# Patient Record
Sex: MALE | Race: WHITE | NOT HISPANIC OR LATINO | Employment: FULL TIME | URBAN - METROPOLITAN AREA
[De-identification: names, ages, dates, MRNs, and addresses within clinical notes are randomized per-mention and may not be internally consistent; named-entity substitution may affect disease eponyms.]

---

## 2022-08-31 ENCOUNTER — HOSPITAL ENCOUNTER (OUTPATIENT)
Facility: HOSPITAL | Age: 52
Setting detail: OBSERVATION
Discharge: HOME | End: 2022-09-02
Attending: EMERGENCY MEDICINE | Admitting: INTERNAL MEDICINE
Payer: COMMERCIAL

## 2022-08-31 DIAGNOSIS — R07.89 ATYPICAL CHEST PAIN: Primary | ICD-10-CM

## 2022-08-31 DIAGNOSIS — I24.9 ACS (ACUTE CORONARY SYNDROME) (HCC): ICD-10-CM

## 2022-08-31 DIAGNOSIS — I10 HTN (HYPERTENSION): ICD-10-CM

## 2022-08-31 DIAGNOSIS — I25.10 CAD (CORONARY ARTERY DISEASE): ICD-10-CM

## 2022-08-31 DIAGNOSIS — I25.110 CORONARY ARTERY DISEASE INVOLVING NATIVE CORONARY ARTERY OF NATIVE HEART WITH UNSTABLE ANGINA PECTORIS (HCC): ICD-10-CM

## 2022-08-31 LAB
BASOPHILS # BLD AUTO: 0.02 THOUSANDS/ΜL (ref 0–0.1)
BASOPHILS NFR BLD AUTO: 0 % (ref 0–1)
EOSINOPHIL # BLD AUTO: 0 THOUSAND/ΜL (ref 0–0.61)
EOSINOPHIL NFR BLD AUTO: 0 % (ref 0–6)
ERYTHROCYTE [DISTWIDTH] IN BLOOD BY AUTOMATED COUNT: 13.2 % (ref 11.6–15.1)
HCT VFR BLD AUTO: 44.9 % (ref 36.5–49.3)
HGB BLD-MCNC: 15.2 G/DL (ref 12–17)
IMM GRANULOCYTES # BLD AUTO: 0.04 THOUSAND/UL (ref 0–0.2)
IMM GRANULOCYTES NFR BLD AUTO: 1 % (ref 0–2)
LYMPHOCYTES # BLD AUTO: 0.25 THOUSANDS/ΜL (ref 0.6–4.47)
LYMPHOCYTES NFR BLD AUTO: 3 % (ref 14–44)
MCH RBC QN AUTO: 32.2 PG (ref 26.8–34.3)
MCHC RBC AUTO-ENTMCNC: 33.9 G/DL (ref 31.4–37.4)
MCV RBC AUTO: 95 FL (ref 82–98)
MONOCYTES # BLD AUTO: 0.68 THOUSAND/ΜL (ref 0.17–1.22)
MONOCYTES NFR BLD AUTO: 8 % (ref 4–12)
NEUTROPHILS # BLD AUTO: 7.86 THOUSANDS/ΜL (ref 1.85–7.62)
NEUTS SEG NFR BLD AUTO: 88 % (ref 43–75)
NRBC BLD AUTO-RTO: 0 /100 WBCS
PLATELET # BLD AUTO: 172 THOUSANDS/UL (ref 149–390)
PMV BLD AUTO: 11.4 FL (ref 8.9–12.7)
RBC # BLD AUTO: 4.72 MILLION/UL (ref 3.88–5.62)
WBC # BLD AUTO: 8.85 THOUSAND/UL (ref 4.31–10.16)

## 2022-08-31 PROCEDURE — 80053 COMPREHEN METABOLIC PANEL: CPT | Performed by: PHYSICIAN ASSISTANT

## 2022-08-31 PROCEDURE — 99285 EMERGENCY DEPT VISIT HI MDM: CPT

## 2022-08-31 PROCEDURE — 85025 COMPLETE CBC W/AUTO DIFF WBC: CPT | Performed by: PHYSICIAN ASSISTANT

## 2022-08-31 PROCEDURE — 85730 THROMBOPLASTIN TIME PARTIAL: CPT | Performed by: PHYSICIAN ASSISTANT

## 2022-08-31 PROCEDURE — 36415 COLL VENOUS BLD VENIPUNCTURE: CPT | Performed by: PHYSICIAN ASSISTANT

## 2022-08-31 PROCEDURE — 93005 ELECTROCARDIOGRAM TRACING: CPT

## 2022-08-31 PROCEDURE — 83036 HEMOGLOBIN GLYCOSYLATED A1C: CPT | Performed by: PHYSICIAN ASSISTANT

## 2022-08-31 PROCEDURE — 85610 PROTHROMBIN TIME: CPT | Performed by: PHYSICIAN ASSISTANT

## 2022-08-31 PROCEDURE — 84484 ASSAY OF TROPONIN QUANT: CPT | Performed by: PHYSICIAN ASSISTANT

## 2022-08-31 RX ORDER — CLOPIDOGREL BISULFATE 75 MG/1
300 TABLET ORAL ONCE
Status: COMPLETED | OUTPATIENT
Start: 2022-08-31 | End: 2022-08-31

## 2022-08-31 RX ORDER — ASPIRIN 325 MG
325 TABLET ORAL ONCE
Status: COMPLETED | OUTPATIENT
Start: 2022-08-31 | End: 2022-08-31

## 2022-08-31 RX ORDER — ONDANSETRON 2 MG/ML
1 INJECTION INTRAMUSCULAR; INTRAVENOUS ONCE
Status: COMPLETED | OUTPATIENT
Start: 2022-08-31 | End: 2022-08-31

## 2022-08-31 RX ADMIN — ASPIRIN 325 MG ORAL TABLET 325 MG: 325 PILL ORAL at 23:42

## 2022-08-31 RX ADMIN — CLOPIDOGREL BISULFATE 300 MG: 75 TABLET ORAL at 23:51

## 2022-08-31 NOTE — Clinical Note
IVUS and IFR wire removed  Two eagle eye catheters were opened  The system is not working at this time

## 2022-09-01 ENCOUNTER — APPOINTMENT (OUTPATIENT)
Dept: NUCLEAR MEDICINE | Facility: HOSPITAL | Age: 52
End: 2022-09-01
Payer: COMMERCIAL

## 2022-09-01 ENCOUNTER — APPOINTMENT (OUTPATIENT)
Dept: NON INVASIVE DIAGNOSTICS | Facility: HOSPITAL | Age: 52
End: 2022-09-01
Payer: COMMERCIAL

## 2022-09-01 ENCOUNTER — APPOINTMENT (OUTPATIENT)
Dept: RADIOLOGY | Facility: HOSPITAL | Age: 52
End: 2022-09-01
Payer: COMMERCIAL

## 2022-09-01 PROBLEM — I25.10 CORONARY ARTERY DISEASE: Chronic | Status: ACTIVE | Noted: 2022-09-01

## 2022-09-01 PROBLEM — E11.9 DIABETES MELLITUS, TYPE 2 (HCC): Chronic | Status: ACTIVE | Noted: 2022-09-01

## 2022-09-01 PROBLEM — I10 HYPERTENSION: Status: ACTIVE | Noted: 2022-09-01

## 2022-09-01 PROBLEM — R07.89 ATYPICAL CHEST PAIN: Status: ACTIVE | Noted: 2022-09-01

## 2022-09-01 LAB
2HR DELTA HS TROPONIN: 0 NG/L
4HR DELTA HS TROPONIN: 1 NG/L
ALBUMIN SERPL BCP-MCNC: 4.4 G/DL (ref 3.5–5)
ALP SERPL-CCNC: 74 U/L (ref 46–116)
ALT SERPL W P-5'-P-CCNC: 45 U/L (ref 12–78)
ANION GAP SERPL CALCULATED.3IONS-SCNC: 12 MMOL/L (ref 4–13)
AORTIC ROOT: 3.8 CM
AORTIC VALVE MEAN VELOCITY: 11.9 M/S
APICAL FOUR CHAMBER EJECTION FRACTION: 65 %
APTT PPP: 26 SECONDS (ref 23–37)
ASCENDING AORTA: 3.8 CM
AST SERPL W P-5'-P-CCNC: 25 U/L (ref 5–45)
ATRIAL RATE: 67 BPM
ATRIAL RATE: 68 BPM
ATRIAL RATE: 72 BPM
ATRIAL RATE: 72 BPM
ATRIAL RATE: 73 BPM
ATRIAL RATE: 75 BPM
AV LVOT MEAN GRADIENT: 5 MMHG
AV LVOT PEAK GRADIENT: 9 MMHG
AV MEAN GRADIENT: 6 MMHG
AV PEAK GRADIENT: 10 MMHG
AV VELOCITY RATIO: 0.95
BILIRUB SERPL-MCNC: 0.47 MG/DL (ref 0.2–1)
BUN SERPL-MCNC: 11 MG/DL (ref 5–25)
CALCIUM SERPL-MCNC: 9.4 MG/DL (ref 8.3–10.1)
CARDIAC TROPONIN I PNL SERPL HS: 7 NG/L
CARDIAC TROPONIN I PNL SERPL HS: 7 NG/L
CARDIAC TROPONIN I PNL SERPL HS: 8 NG/L
CHLORIDE SERPL-SCNC: 101 MMOL/L (ref 96–108)
CO2 SERPL-SCNC: 25 MMOL/L (ref 21–32)
CREAT SERPL-MCNC: 1.07 MG/DL (ref 0.6–1.3)
DOP CALC AO PEAK VEL: 1.59 M/S
DOP CALC AO VTI: 28.08 CM
DOP CALC LVOT PEAK VEL VTI: 27.57 CM
DOP CALC LVOT PEAK VEL: 1.51 M/S
E WAVE DECELERATION TIME: 283 MS
EST. AVERAGE GLUCOSE BLD GHB EST-MCNC: 120 MG/DL
FRACTIONAL SHORTENING: 31 % (ref 28–44)
GFR SERPL CREATININE-BSD FRML MDRD: 80 ML/MIN/1.73SQ M
GLUCOSE SERPL-MCNC: 107 MG/DL (ref 65–140)
GLUCOSE SERPL-MCNC: 127 MG/DL (ref 65–140)
GLUCOSE SERPL-MCNC: 88 MG/DL (ref 65–140)
GLUCOSE SERPL-MCNC: 90 MG/DL (ref 65–140)
GLUCOSE SERPL-MCNC: 98 MG/DL (ref 65–140)
HBA1C MFR BLD: 5.8 %
INR PPP: 1.02 (ref 0.84–1.19)
INTERVENTRICULAR SEPTUM IN DIASTOLE (PARASTERNAL SHORT AXIS VIEW): 1.5 CM
INTERVENTRICULAR SEPTUM: 1.5 CM (ref 0.6–1.1)
LAAS-AP2: 15.5 CM2
LAAS-AP4: 18.9 CM2
LEFT ATRIUM AREA SYSTOLE SINGLE PLANE A4C: 19.9 CM2
LEFT ATRIUM SIZE: 3.5 CM
LEFT INTERNAL DIMENSION IN SYSTOLE: 2.5 CM (ref 2.1–4)
LEFT VENTRICULAR INTERNAL DIMENSION IN DIASTOLE: 3.6 CM (ref 3.5–6)
LEFT VENTRICULAR POSTERIOR WALL IN END DIASTOLE: 1.3 CM
LEFT VENTRICULAR STROKE VOLUME: 30 ML
LVSV (TEICH): 30 ML
MV E'TISSUE VEL-SEP: 9 CM/S
MV PEAK A VEL: 0.88 M/S
MV PEAK E VEL: 65 CM/S
MV STENOSIS PRESSURE HALF TIME: 82 MS
MV VALVE AREA P 1/2 METHOD: 2.68 CM2
P AXIS: 33 DEGREES
P AXIS: 39 DEGREES
P AXIS: 39 DEGREES
P AXIS: 46 DEGREES
P AXIS: 47 DEGREES
P AXIS: 70 DEGREES
POTASSIUM SERPL-SCNC: 4.2 MMOL/L (ref 3.5–5.3)
PR INTERVAL: 190 MS
PR INTERVAL: 194 MS
PR INTERVAL: 200 MS
PR INTERVAL: 200 MS
PR INTERVAL: 204 MS
PR INTERVAL: 208 MS
PROT SERPL-MCNC: 8.2 G/DL (ref 6.4–8.4)
PROTHROMBIN TIME: 13.2 SECONDS (ref 11.6–14.5)
QRS AXIS: -47 DEGREES
QRS AXIS: -52 DEGREES
QRS AXIS: -53 DEGREES
QRS AXIS: -55 DEGREES
QRS AXIS: -57 DEGREES
QRS AXIS: -57 DEGREES
QRSD INTERVAL: 100 MS
QRSD INTERVAL: 80 MS
QRSD INTERVAL: 82 MS
QRSD INTERVAL: 84 MS
QRSD INTERVAL: 86 MS
QRSD INTERVAL: 88 MS
QT INTERVAL: 376 MS
QT INTERVAL: 376 MS
QT INTERVAL: 378 MS
QT INTERVAL: 380 MS
QT INTERVAL: 390 MS
QT INTERVAL: 394 MS
QTC INTERVAL: 411 MS
QTC INTERVAL: 412 MS
QTC INTERVAL: 416 MS
QTC INTERVAL: 416 MS
QTC INTERVAL: 418 MS
QTC INTERVAL: 419 MS
RIGHT ATRIUM AREA SYSTOLE A4C: 14.8 CM2
RIGHT VENTRICLE ID DIMENSION: 2.9 CM
SL CV LEFT ATRIUM LENGTH A2C: 5.1 CM
SL CV LV EF: 55
SL CV PED ECHO LEFT VENTRICLE DIASTOLIC VOLUME (MOD BIPLANE) 2D: 54 ML
SL CV PED ECHO LEFT VENTRICLE SYSTOLIC VOLUME (MOD BIPLANE) 2D: 23 ML
SODIUM SERPL-SCNC: 138 MMOL/L (ref 135–147)
T WAVE AXIS: 72 DEGREES
T WAVE AXIS: 72 DEGREES
T WAVE AXIS: 78 DEGREES
T WAVE AXIS: 79 DEGREES
T WAVE AXIS: 80 DEGREES
T WAVE AXIS: 84 DEGREES
VENTRICULAR RATE: 67 BPM
VENTRICULAR RATE: 68 BPM
VENTRICULAR RATE: 72 BPM
VENTRICULAR RATE: 72 BPM
VENTRICULAR RATE: 73 BPM
VENTRICULAR RATE: 75 BPM

## 2022-09-01 PROCEDURE — C1769 GUIDE WIRE: HCPCS | Performed by: INTERNAL MEDICINE

## 2022-09-01 PROCEDURE — G1004 CDSM NDSC: HCPCS

## 2022-09-01 PROCEDURE — C1753 CATH, INTRAVAS ULTRASOUND: HCPCS | Performed by: INTERNAL MEDICINE

## 2022-09-01 PROCEDURE — 93571 IV DOP VEL&/PRESS C FLO 1ST: CPT | Performed by: INTERNAL MEDICINE

## 2022-09-01 PROCEDURE — 93018 CV STRESS TEST I&R ONLY: CPT | Performed by: INTERNAL MEDICINE

## 2022-09-01 PROCEDURE — A9502 TC99M TETROFOSMIN: HCPCS

## 2022-09-01 PROCEDURE — C1725 CATH, TRANSLUMIN NON-LASER: HCPCS | Performed by: INTERNAL MEDICINE

## 2022-09-01 PROCEDURE — 99153 MOD SED SAME PHYS/QHP EA: CPT | Performed by: INTERNAL MEDICINE

## 2022-09-01 PROCEDURE — 84484 ASSAY OF TROPONIN QUANT: CPT | Performed by: PHYSICIAN ASSISTANT

## 2022-09-01 PROCEDURE — 92978 ENDOLUMINL IVUS OCT C 1ST: CPT | Performed by: INTERNAL MEDICINE

## 2022-09-01 PROCEDURE — C1874 STENT, COATED/COV W/DEL SYS: HCPCS | Performed by: INTERNAL MEDICINE

## 2022-09-01 PROCEDURE — 78452 HT MUSCLE IMAGE SPECT MULT: CPT | Performed by: INTERNAL MEDICINE

## 2022-09-01 PROCEDURE — 36415 COLL VENOUS BLD VENIPUNCTURE: CPT | Performed by: PHYSICIAN ASSISTANT

## 2022-09-01 PROCEDURE — 92928 PRQ TCAT PLMT NTRAC ST 1 LES: CPT | Performed by: INTERNAL MEDICINE

## 2022-09-01 PROCEDURE — 93016 CV STRESS TEST SUPVJ ONLY: CPT | Performed by: INTERNAL MEDICINE

## 2022-09-01 PROCEDURE — 93010 ELECTROCARDIOGRAM REPORT: CPT | Performed by: INTERNAL MEDICINE

## 2022-09-01 PROCEDURE — 93017 CV STRESS TEST TRACING ONLY: CPT

## 2022-09-01 PROCEDURE — 99285 EMERGENCY DEPT VISIT HI MDM: CPT | Performed by: PHYSICIAN ASSISTANT

## 2022-09-01 PROCEDURE — 82948 REAGENT STRIP/BLOOD GLUCOSE: CPT

## 2022-09-01 PROCEDURE — 99152 MOD SED SAME PHYS/QHP 5/>YRS: CPT | Performed by: INTERNAL MEDICINE

## 2022-09-01 PROCEDURE — 99205 OFFICE O/P NEW HI 60 MIN: CPT | Performed by: INTERNAL MEDICINE

## 2022-09-01 PROCEDURE — 93458 L HRT ARTERY/VENTRICLE ANGIO: CPT | Performed by: INTERNAL MEDICINE

## 2022-09-01 PROCEDURE — 85347 COAGULATION TIME ACTIVATED: CPT

## 2022-09-01 PROCEDURE — C9600 PERC DRUG-EL COR STENT SING: HCPCS | Performed by: INTERNAL MEDICINE

## 2022-09-01 PROCEDURE — 93306 TTE W/DOPPLER COMPLETE: CPT

## 2022-09-01 PROCEDURE — C1887 CATHETER, GUIDING: HCPCS | Performed by: INTERNAL MEDICINE

## 2022-09-01 PROCEDURE — C1894 INTRO/SHEATH, NON-LASER: HCPCS | Performed by: INTERNAL MEDICINE

## 2022-09-01 PROCEDURE — 93005 ELECTROCARDIOGRAM TRACING: CPT

## 2022-09-01 PROCEDURE — 71045 X-RAY EXAM CHEST 1 VIEW: CPT

## 2022-09-01 PROCEDURE — 78452 HT MUSCLE IMAGE SPECT MULT: CPT

## 2022-09-01 PROCEDURE — 99219 PR INITIAL OBSERVATION CARE/DAY 50 MINUTES: CPT | Performed by: INTERNAL MEDICINE

## 2022-09-01 PROCEDURE — 93306 TTE W/DOPPLER COMPLETE: CPT | Performed by: INTERNAL MEDICINE

## 2022-09-01 DEVICE — STENT ONYXNG27512UX ONYX 2.75X12RX
Type: IMPLANTABLE DEVICE | Status: FUNCTIONAL
Brand: ONYX FRONTIER™

## 2022-09-01 DEVICE — STENT ONYXNG27538UX ONYX 2.75X38RX
Type: IMPLANTABLE DEVICE | Status: FUNCTIONAL
Brand: ONYX FRONTIER™

## 2022-09-01 RX ORDER — LIDOCAINE 50 MG/G
1 PATCH TOPICAL DAILY PRN
Status: DISCONTINUED | OUTPATIENT
Start: 2022-09-01 | End: 2022-09-02 | Stop reason: HOSPADM

## 2022-09-01 RX ORDER — NITROGLYCERIN 20 MG/100ML
INJECTION INTRAVENOUS AS NEEDED
Status: DISCONTINUED | OUTPATIENT
Start: 2022-09-01 | End: 2022-09-01 | Stop reason: HOSPADM

## 2022-09-01 RX ORDER — METOPROLOL SUCCINATE 25 MG/1
25 TABLET, EXTENDED RELEASE ORAL DAILY
COMMUNITY
End: 2022-09-16 | Stop reason: SDUPTHER

## 2022-09-01 RX ORDER — AMINOPHYLLINE DIHYDRATE 25 MG/ML
50 INJECTION, SOLUTION INTRAVENOUS ONCE
Status: COMPLETED | OUTPATIENT
Start: 2022-09-01 | End: 2022-09-01

## 2022-09-01 RX ORDER — HEPARIN SODIUM 1000 [USP'U]/ML
INJECTION, SOLUTION INTRAVENOUS; SUBCUTANEOUS AS NEEDED
Status: DISCONTINUED | OUTPATIENT
Start: 2022-09-01 | End: 2022-09-01 | Stop reason: HOSPADM

## 2022-09-01 RX ORDER — AMLODIPINE BESYLATE 10 MG/1
10 TABLET ORAL DAILY
COMMUNITY
End: 2022-09-16 | Stop reason: SDUPTHER

## 2022-09-01 RX ORDER — METOPROLOL SUCCINATE 25 MG/1
25 TABLET, EXTENDED RELEASE ORAL DAILY
Status: DISCONTINUED | OUTPATIENT
Start: 2022-09-01 | End: 2022-09-02 | Stop reason: HOSPADM

## 2022-09-01 RX ORDER — ASPIRIN 81 MG/1
81 TABLET ORAL DAILY
Status: DISCONTINUED | OUTPATIENT
Start: 2022-09-01 | End: 2022-09-02 | Stop reason: HOSPADM

## 2022-09-01 RX ORDER — SPIRONOLACTONE 25 MG/1
25 TABLET ORAL DAILY
COMMUNITY
End: 2022-09-06 | Stop reason: SDUPTHER

## 2022-09-01 RX ORDER — ASPIRIN 81 MG/1
81 TABLET ORAL DAILY
COMMUNITY
End: 2022-09-16 | Stop reason: SDUPTHER

## 2022-09-01 RX ORDER — FENTANYL CITRATE 50 UG/ML
INJECTION, SOLUTION INTRAMUSCULAR; INTRAVENOUS AS NEEDED
Status: DISCONTINUED | OUTPATIENT
Start: 2022-09-01 | End: 2022-09-01 | Stop reason: HOSPADM

## 2022-09-01 RX ORDER — MAGNESIUM HYDROXIDE/ALUMINUM HYDROXICE/SIMETHICONE 120; 1200; 1200 MG/30ML; MG/30ML; MG/30ML
30 SUSPENSION ORAL EVERY 6 HOURS PRN
Status: DISCONTINUED | OUTPATIENT
Start: 2022-09-01 | End: 2022-09-02 | Stop reason: HOSPADM

## 2022-09-01 RX ORDER — MIDAZOLAM HYDROCHLORIDE 2 MG/2ML
INJECTION, SOLUTION INTRAMUSCULAR; INTRAVENOUS AS NEEDED
Status: DISCONTINUED | OUTPATIENT
Start: 2022-09-01 | End: 2022-09-01 | Stop reason: HOSPADM

## 2022-09-01 RX ORDER — NICOTINE 21 MG/24HR
1 PATCH, TRANSDERMAL 24 HOURS TRANSDERMAL DAILY
Status: DISCONTINUED | OUTPATIENT
Start: 2022-09-01 | End: 2022-09-01

## 2022-09-01 RX ORDER — ATORVASTATIN CALCIUM 80 MG/1
80 TABLET, FILM COATED ORAL
Status: ON HOLD | COMMUNITY
End: 2022-09-02 | Stop reason: SDUPTHER

## 2022-09-01 RX ORDER — AMLODIPINE BESYLATE 10 MG/1
10 TABLET ORAL DAILY
Status: DISCONTINUED | OUTPATIENT
Start: 2022-09-01 | End: 2022-09-02 | Stop reason: HOSPADM

## 2022-09-01 RX ORDER — HEPARIN SODIUM 5000 [USP'U]/ML
5000 INJECTION, SOLUTION INTRAVENOUS; SUBCUTANEOUS EVERY 8 HOURS SCHEDULED
Status: DISCONTINUED | OUTPATIENT
Start: 2022-09-01 | End: 2022-09-02 | Stop reason: HOSPADM

## 2022-09-01 RX ORDER — CLOPIDOGREL BISULFATE 75 MG/1
TABLET ORAL AS NEEDED
Status: DISCONTINUED | OUTPATIENT
Start: 2022-09-01 | End: 2022-09-01 | Stop reason: HOSPADM

## 2022-09-01 RX ORDER — LISINOPRIL 10 MG/1
10 TABLET ORAL 2 TIMES DAILY
Status: DISCONTINUED | OUTPATIENT
Start: 2022-09-01 | End: 2022-09-02 | Stop reason: HOSPADM

## 2022-09-01 RX ORDER — SPIRONOLACTONE 25 MG/1
25 TABLET ORAL DAILY
Status: DISCONTINUED | OUTPATIENT
Start: 2022-09-01 | End: 2022-09-02 | Stop reason: HOSPADM

## 2022-09-01 RX ORDER — CLOPIDOGREL BISULFATE 75 MG/1
75 TABLET ORAL DAILY
Status: DISCONTINUED | OUTPATIENT
Start: 2022-09-01 | End: 2022-09-02 | Stop reason: HOSPADM

## 2022-09-01 RX ORDER — ONDANSETRON 2 MG/ML
4 INJECTION INTRAMUSCULAR; INTRAVENOUS EVERY 6 HOURS PRN
Status: DISCONTINUED | OUTPATIENT
Start: 2022-09-01 | End: 2022-09-02 | Stop reason: HOSPADM

## 2022-09-01 RX ORDER — ACETAMINOPHEN 325 MG/1
650 TABLET ORAL EVERY 6 HOURS PRN
Status: DISCONTINUED | OUTPATIENT
Start: 2022-09-01 | End: 2022-09-02 | Stop reason: HOSPADM

## 2022-09-01 RX ORDER — ATORVASTATIN CALCIUM 40 MG/1
80 TABLET, FILM COATED ORAL
Status: DISCONTINUED | OUTPATIENT
Start: 2022-09-01 | End: 2022-09-02 | Stop reason: HOSPADM

## 2022-09-01 RX ORDER — VERAPAMIL HCL 2.5 MG/ML
AMPUL (ML) INTRAVENOUS AS NEEDED
Status: DISCONTINUED | OUTPATIENT
Start: 2022-09-01 | End: 2022-09-01 | Stop reason: HOSPADM

## 2022-09-01 RX ORDER — SODIUM CHLORIDE 9 MG/ML
100 INJECTION, SOLUTION INTRAVENOUS CONTINUOUS
Status: DISPENSED | OUTPATIENT
Start: 2022-09-01 | End: 2022-09-01

## 2022-09-01 RX ORDER — NICOTINE 21 MG/24HR
1 PATCH, TRANSDERMAL 24 HOURS TRANSDERMAL DAILY
Status: DISCONTINUED | OUTPATIENT
Start: 2022-09-01 | End: 2022-09-02 | Stop reason: HOSPADM

## 2022-09-01 RX ORDER — LIDOCAINE HYDROCHLORIDE 10 MG/ML
INJECTION, SOLUTION EPIDURAL; INFILTRATION; INTRACAUDAL; PERINEURAL AS NEEDED
Status: DISCONTINUED | OUTPATIENT
Start: 2022-09-01 | End: 2022-09-01 | Stop reason: HOSPADM

## 2022-09-01 RX ORDER — LISINOPRIL 10 MG/1
10 TABLET ORAL 2 TIMES DAILY
COMMUNITY
End: 2022-09-12

## 2022-09-01 RX ORDER — NITROGLYCERIN 0.4 MG/1
0.4 TABLET SUBLINGUAL
Status: DISCONTINUED | OUTPATIENT
Start: 2022-09-01 | End: 2022-09-02 | Stop reason: HOSPADM

## 2022-09-01 RX ADMIN — SODIUM CHLORIDE 100 ML/HR: 0.9 INJECTION, SOLUTION INTRAVENOUS at 17:23

## 2022-09-01 RX ADMIN — ATORVASTATIN CALCIUM 80 MG: 40 TABLET, FILM COATED ORAL at 21:34

## 2022-09-01 RX ADMIN — HEPARIN SODIUM 5000 UNITS: 5000 INJECTION INTRAVENOUS; SUBCUTANEOUS at 21:35

## 2022-09-01 RX ADMIN — LISINOPRIL 10 MG: 10 TABLET ORAL at 17:22

## 2022-09-01 RX ADMIN — ASPIRIN 81 MG: 81 TABLET, COATED ORAL at 08:37

## 2022-09-01 RX ADMIN — NICOTINE 1 PATCH: 21 PATCH, EXTENDED RELEASE TRANSDERMAL at 07:31

## 2022-09-01 RX ADMIN — AMINOPHYLLINE 50 MG: 25 INJECTION, SOLUTION INTRAVENOUS at 14:06

## 2022-09-01 RX ADMIN — CLOPIDOGREL BISULFATE 75 MG: 75 TABLET ORAL at 08:37

## 2022-09-01 RX ADMIN — REGADENOSON 0.4 MG: 0.08 INJECTION, SOLUTION INTRAVENOUS at 14:01

## 2022-09-01 NOTE — ASSESSMENT & PLAN NOTE
· CARMEL score 4  · Obtain chest x-ray  · Initial 2 troponins negative at 7, delta 0  · EKG with abnormal ST changes, sinus rhythm  · Per ED provider, spoke with interventional cardiologist on-call who recommends at this time loading patient with aspirin Plavix and holding off on initiating heparin drip, will be seen later in a m  · Appreciate cardiology consult  · Will hold heparin drip for now pending cardiology consult  · Continue to trend troponin with EKG  · Telemetry  · Will keep NPO for now pending cardiology consult  · Given loading dose 324 mg aspirin 300 mg Plavix in the ED  · Will continue home 81 mg daily aspirin and atorvastatin, will add Plavix 75 mg daily  · P r n   Sublingual nitroglycerin, Tylenol, lidocaine patch for pain

## 2022-09-01 NOTE — CASE MANAGEMENT
Case Management Progress Note    Patient name Puma Martinez  Location ED 21/ED 21 MRN [id number]  : 1970 Date 2022       LOS (days): 0  Geometric Mean LOS (GMLOS) (days):   Days to GMLOS:        OBJECTIVE:        Current admission status: Observation  Preferred Pharmacy: No Pharmacies Listed  Primary Care Provider: No primary care provider on file  Primary Insurance: [employer]  Secondary Insurance:     PROGRESS NOTE:    Per chart review, patient was IPTA  Cardiology has been consulted and has a chest xray pending  No CM needs identified at this time  CM department will continue to follow patient through discharge

## 2022-09-01 NOTE — ASSESSMENT & PLAN NOTE
No results found for: HGBA1C    No results for input(s): POCGLU in the last 72 hours      Blood Sugar Average: Last 72 hrs:   check A1c  Patient currently not on any diabetic medications  Blood glucose checks Q 6 hours while NPO, hypoglycemia protocol

## 2022-09-01 NOTE — CONSULTS
Consultation - Cardiology   Qian Jasso 50 y o  male MRN: [id number]  Unit/Bed#: [facility] CATH LAB ROOM Encounter: [id number]  09/01/22  3:23 PM    Assessment:  1  Chest pain    2  CAD s/p PCI x2  3  Hypertension   4  Hyperlipidemia   5  DM2 - A1c 5 8%  6  Heavy tobacco abuse     Plan:  Patients symptoms are concerning for possible angina  ER physician contacted on-call interventionalist who recommended Plavix load with 300 mg x1 along with  mg x1  Trend troponins and start heparin drip if elevated  Troponin trend unremarkable overnight however, given his cardiac history and symptoms, will evaluate with Pharmacologic MPI and TTE to further assess  Continue with Norvasc 10 mg, aspirin 81 mg, atorvastatin 80 mg, lisinopril 10 mg, Toprol XL 25 mg and Aldactone 25 mg daily  Start Plavix 75mg QD and SL NTG PRN   Continue telemetry monitoring/serial EKGs PRN  Smoking cessation was strongly advised    **Addendum: Patient become diaphoretic and hypotensive (w/ BP 80/50s) after receiving Lexiscan  Patient was given theophylline and NS 500mL bolus x1  Patient symptoms resolved with improvement to his BP (150/80s)  Decision was made to abort stress test at that time and proceed directly for cardiac catheterization  Patient was transferred for the procedure  Further recommendations will be based on cath findings  **     Diagnostics:  HS troponin trend 8/31- 9/1/22: 7>7>8  EKG 9/1/22: NSR, left axis deviation, low-voltage QRS   TTE 8/31/22: EF 55%, mild hypokinesis of the basal inferior, mildly G1DD  History of Present Illness   Physician Requesting Consult: Manda Heck MD  Reason for Consult / Principal Problem:  Chest pain, history of CAD s/p PCI  HPI: Qian Jasso is a 50y o  year old male with history of CAD w/ MI x2 with PCI, hypertension, hyperlipidemia, DM2 who presented with intermittent diaphoresis and nausea since which occurred at approximately 8:30pm yesterday evening   He denies any overt chest pain but reports of same symptoms when he had his 2 prior Mis in the past  Patient was following with a cardiologist in [place] but recently moved into this area and has not established yet with a new cardiologist  His prior Mis were in 2015 and 2018 and received a total of 2 stents  He is unsure of which arteries were stented  He reports he has been taking his medications and denies any recent issues otherwise  He is somewhat active at home and does not have any exertional component  Works from home as a  in the [employer]  Reports he is a heavy smoker (up to 2ppd) with occasional marijuana use and does not watch his diet at home  Inpatient consult to Cardiology  Consult performed by: Brayden Ortiz PA-C  Consult ordered by: Shelly Gill PA-C        Review of Systems   Constitutional: Positive for chills and diaphoresis  Negative for appetite change, fatigue and fever  Respiratory: Negative for cough, chest tightness and shortness of breath  Cardiovascular: Negative for chest pain, palpitations and leg swelling  Gastrointestinal: Negative for diarrhea, nausea and vomiting  Endocrine: Negative for cold intolerance and heat intolerance  Genitourinary: Negative for difficulty urinating, dysuria and enuresis  Musculoskeletal: Negative for arthralgias, back pain and gait problem  Allergic/Immunologic: Negative for environmental allergies and food allergies  Neurological: Negative for dizziness, facial asymmetry and headaches  Hematological: Negative for adenopathy  Does not bruise/bleed easily  Psychiatric/Behavioral: Negative for agitation, behavioral problems and confusion  Historical Information   Past Medical History:   Diagnosis Date    Diabetes mellitus ([facility] [address] )     Hypertension      History reviewed  No pertinent surgical history    Social History     Substance and Sexual Activity   Alcohol Use Never     Social History     Substance and Sexual Activity   Drug Use Yes    Types: Marijuana     Social History     Tobacco Use   Smoking Status Current Every Day Smoker    Packs/day: 2 00   Smokeless Tobacco Never Used     Family History: History reviewed  No pertinent family history  Meds/Allergies   current meds:   Current Facility-Administered Medications   Medication Dose Route Frequency    acetaminophen (TYLENOL) tablet 650 mg  650 mg Oral Q6H PRN    aluminum-magnesium hydroxide-simethicone (MYLANTA) oral suspension 30 mL  30 mL Oral Q6H PRN    amLODIPine (NORVASC) tablet 10 mg  10 mg Oral Daily    aspirin (ECOTRIN LOW STRENGTH) EC tablet 81 mg  81 mg Oral Daily    atorvastatin (LIPITOR) tablet 80 mg  80 mg Oral HS    bivalirudin (ANGIOMAX) bolus from bag    PRN    Bivalirudin Trifluoroacetate (ANGIOMAX) 250 mg in sodium chloride 0 9 % 50 mL infusion    Continuous PRN    clopidogrel (PLAVIX) tablet 75 mg  75 mg Oral Daily    fentanyl citrate (PF) 100 MCG/2ML    PRN    heparin (porcine) injection    PRN    heparin (porcine) subcutaneous injection 5,000 Units  5,000 Units Subcutaneous Q8H [address]    lidocaine (LIDODERM) 5 % patch 1 patch  1 patch Topical Daily PRN    lidocaine (PF) (XYLOCAINE-MPF) 1 % injection    PRN    lisinopril (ZESTRIL) tablet 10 mg  10 mg Oral BID    metoprolol succinate (TOPROL-XL) 24 hr tablet 25 mg  25 mg Oral Daily    midazolam (VERSED) injection    PRN    nicotine (NICODERM CQ) 21 mg/24 hr TD 24 hr patch 1 patch  1 patch Transdermal Daily    nitroglycerin (NITROSTAT) SL tablet 0 4 mg  0 4 mg Sublingual Q5 Min PRN    nitroGLYcerin 200 mcg/mL IA bolus    PRN    nitroGLYcerin 200 mcg/mL IC bolus    PRN    ondansetron (ZOFRAN) injection 4 mg  4 mg Intravenous Q6H PRN    spironolactone (ALDACTONE) tablet 25 mg  25 mg Oral Daily    verapamil (ISOPTIN) injection    PRN     No Known Allergies    Objective   Vitals: Blood pressure 115/60, pulse 72, temperature 98 4 °F (36 9 °C), temperature source Oral, resp   rate 20, height 6' (1 829 m), weight 116 kg (255 lb), SpO2 94 %  , Body mass index is 34 58 kg/m² ,   Orthostatic Blood Pressures    Flowsheet Row Most Recent Value   Blood Pressure 115/60 filed at 09/01/2022 1230   Patient Position - Orthostatic VS Lying filed at 09/01/2022 1742        Systolic (71ZNA), XZP:436 , Min:102 , YOLANDA:047     Diastolic (05KWK), QKP:62, Min:53, Max:74    No intake or output data in the 24 hours ending 09/01/22 1523    Invasive Devices  Report    Peripheral Intravenous Line  Duration           Peripheral IV 09/01/22 Left Antecubital <1 day          Line  Duration           Arterial Sheath 6 Fr  Right Radial <1 day                Physical Exam:  GEN: Alert and oriented x 3, in no acute distress  Well appearing and well nourished  HEENT: Sclera anicteric, conjunctivae pink, mucous membranes moist  Oropharynx clear  NECK: Supple, no carotid bruits, no significant JVD  Trachea midline, no thyromegaly  HEART: Regular rhythm, normal S1 and S2, no murmurs, clicks, gallops or rubs  PMI nondisplaced, no thrills  LUNGS: Clear to auscultation bilaterally; no wheezes, rales, or rhonchi  No increased work of breathing or signs of respiratory distress  ABDOMEN: Soft, nontender, nondistended, normoactive bowel sounds  EXTREMITIES: Skin warm and well perfused, no clubbing, cyanosis, or edema  NEURO: No focal findings  Normal speech  Mood and affect normal    SKIN: Normal without suspicious lesions on exposed skin      Lab Results:   Troponins:     CBC with diff:   Results from last 7 days   Lab Units 08/31/22  2341   WBC Thousand/uL 8 85   HEMOGLOBIN g/dL 15 2   HEMATOCRIT % 44 9   MCV fL 95   PLATELETS Thousands/uL 172   MCH pg 32 2   MCHC g/dL 33 9   RDW % 13 2   MPV fL 11 4   NRBC AUTO /100 WBCs 0     CMP:   Results from last 7 days   Lab Units 08/31/22  2341   POTASSIUM mmol/L 4 2   CHLORIDE mmol/L 101   CO2 mmol/L 25   BUN mg/dL 11   CREATININE mg/dL 1 07   CALCIUM mg/dL 9 4   AST U/L 25   ALT U/L 45   ALK PHOS U/L 74   EGFR ml/min/1 73sq m 80

## 2022-09-01 NOTE — H&P
[facility]  H&P- Fabby Rainey 1970, 50 y o  male MRN: [id number]  Unit/Bed#: ED 21 Encounter: [id number]  Primary Care Provider: No primary care provider on file  Date and time admitted to hospital: 8/31/2022 11:18 PM    * Atypical chest pain  Assessment & Plan  · CARMEL score 4  · Obtain chest x-ray  · Initial 2 troponins negative at 7, delta 0  · EKG with abnormal ST changes, sinus rhythm  · Per ED provider, spoke with interventional cardiologist on-call who recommends at this time loading patient with aspirin Plavix and holding off on initiating heparin drip, will be seen later in a m  · Appreciate cardiology consult  · Will hold heparin drip for now pending cardiology consult  · Continue to trend troponin with EKG  · Telemetry  · Will keep NPO for now pending cardiology consult  · Given loading dose 324 mg aspirin 300 mg Plavix in the ED  · Will continue home 81 mg daily aspirin and atorvastatin, will add Plavix 75 mg daily  · P r n  Sublingual nitroglycerin, Tylenol, lidocaine patch for pain    [facility]  Assessment & Plan  No results found for: HGBA1C    No results for input(s): POCGLU in the last 72 hours  Blood Sugar Average: Last 72 hrs:   check A1c  Patient currently not on any diabetic medications  Blood glucose checks Q 6 hours while NPO, hypoglycemia protocol      VTE Pharmacologic Prophylaxis: VTE Score: 3 Moderate Risk (Score 3-4) - Pharmacological DVT Prophylaxis Ordered: heparin  Code Status: Level 1 - Full Code per pt  Discussion with family: pt  Anticipated Length of Stay: Patient will be admitted on an observation basis with an anticipated length of stay of less than 2 midnights secondary to see above  Total Time for Visit, including Counseling / Coordination of Care: 60 minutes Greater than 50% of this total time spent on direct patient counseling and coordination of care      Chief Complaint:    Chief Complaint   Patient presents with    Chest Pain     Pt arrives via EMS after 3+ hrs of severe diaphoresis, cardiac hx previous MI, states "it feels like my last heart attack"       History of Present Illness:  Pankaj Blas is a 50 y o  male with a PMH of CAD status post stent placement, history of ACS, hypertension, diabetes mellitus type 2 who presents with complaint of sudden onset all over chest pressure/discomfort that started this morning but gradually became much worse  Then patient reports 8:30 p m  He had an episode of being very diaphoretic along with the chest pressure and chills which he reports is very similar to his previous heart attacks  He reports he has a history of 2 heart attacks with stents placed  He reports he has been taking all his medication as prescribed, but moved from [place] recently and currently has no outpatient follow-up set up  He currently denies chest pressure or chest pain  Denies shortness of breath, dizziness    Review of Systems:  Review of Systems   Constitutional: Positive for chills and diaphoresis  Negative for activity change  Respiratory: Negative for shortness of breath  Cardiovascular: Positive for chest pain  Gastrointestinal: Negative for abdominal pain  Neurological: Negative for dizziness, light-headedness and headaches  Past Medical and Surgical History:   Past Medical History:   Diagnosis Date    Diabetes mellitus ([facility] [address] )     Hypertension        History reviewed  No pertinent surgical history  Meds/Allergies:  Prior to Admission medications    Medication Sig Start Date End Date Taking?  Authorizing Provider   amLODIPine (NORVASC) 10 mg tablet Take 10 mg by mouth daily   Yes Historical Provider, MD   aspirin (ECOTRIN LOW STRENGTH) 81 mg EC tablet Take 81 mg by mouth daily   Yes Historical Provider, MD   atorvastatin (LIPITOR) 80 mg tablet Take 80 mg by mouth daily at bedtime   Yes Historical Provider, MD   lisinopril (ZESTRIL) 10 mg tablet Take 10 mg by mouth 2 (two) times a day   Yes Historical Provider, MD   metoprolol succinate (TOPROL-XL) 25 mg 24 hr tablet Take 25 mg by mouth daily   Yes Historical Provider, MD   spironolactone (ALDACTONE) 25 mg tablet Take 25 mg by mouth daily   Yes Historical Provider, MD     I have reviewed home medications with patient personally  Allergies: No Known Allergies    Social History:  Marital Status: Single     Patient Pre-hospital Living Situation: Home  Patient Pre-hospital Level of Mobility: walks  Patient Pre-hospital Diet Restrictions: cardiac    Substance Use History:   Social History     Substance and Sexual Activity   Alcohol Use Never     Social History     Tobacco Use   Smoking Status Current Every Day Smoker    Packs/day: 2 00   Smokeless Tobacco Never Used     Social History     Substance and Sexual Activity   Drug Use Yes    Types: Marijuana       Family History:  History reviewed  No pertinent family history  Physical Exam:     Vitals:   Blood Pressure: 107/58 (09/01/22 0300)  Pulse: 63 (09/01/22 0300)  Temperature: 98 4 °F (36 9 °C) (08/31/22 2322)  Temp Source: Oral (08/31/22 2322)  Respirations: 22 (09/01/22 0300)  SpO2: 95 % (09/01/22 0300)    Physical Exam  Vitals and nursing note reviewed  Constitutional:       General: He is not in acute distress  Appearance: Normal appearance  He is not toxic-appearing or diaphoretic  HENT:      Head: Normocephalic  Cardiovascular:      Rate and Rhythm: Normal rate and regular rhythm  Heart sounds: Normal heart sounds  Pulmonary:      Effort: Pulmonary effort is normal  No respiratory distress  Breath sounds: Normal breath sounds  Abdominal:      General: Abdomen is flat  Bowel sounds are normal       Palpations: Abdomen is soft  Tenderness: There is no abdominal tenderness  Musculoskeletal:         General: No tenderness  Right lower leg: No edema  Left lower leg: No edema  Skin:     General: Skin is warm and dry     Neurological: General: No focal deficit present  Mental Status: He is alert and oriented to person, place, and time  Psychiatric:         Mood and Affect: Mood normal          Behavior: Behavior normal          Thought Content: Thought content normal          Judgment: Judgment normal           Additional Data:     Lab Results:  Results from last 7 days   Lab Units 08/31/22  2341   WBC Thousand/uL 8 85   HEMOGLOBIN g/dL 15 2   HEMATOCRIT % 44 9   PLATELETS Thousands/uL 172   NEUTROS PCT % 88*   LYMPHS PCT % 3*   MONOS PCT % 8   EOS PCT % 0     Results from last 7 days   Lab Units 08/31/22  2341   SODIUM mmol/L 138   POTASSIUM mmol/L 4 2   CHLORIDE mmol/L 101   CO2 mmol/L 25   BUN mg/dL 11   CREATININE mg/dL 1 07   ANION GAP mmol/L 12   CALCIUM mg/dL 9 4   ALBUMIN g/dL 4 4   TOTAL BILIRUBIN mg/dL 0 47   ALK PHOS U/L 74   ALT U/L 45   AST U/L 25   GLUCOSE RANDOM mg/dL 127     Results from last 7 days   Lab Units 08/31/22  2341   INR  1 02                   Imaging: No pertinent imaging reviewed  XR chest portable    (Results Pending)       EKG and Other Studies Reviewed on Admission:   · EKG: Sinus rhythm, ST abnormality x3     ** Please Note: This note has been constructed using a voice recognition system   **

## 2022-09-01 NOTE — ED PROVIDER NOTES
History  Chief Complaint   Patient presents with    Chest Pain     Pt arrives via EMS after 3+ hrs of severe diaphoresis, cardiac hx previous MI, states "it feels like my last heart attack"     Patient is a 53-year-old male with a past medical history significant for diabetes, hypertension, coronary artery disease presenting to the emergency department for evaluation of chest discomfort, diaphoresis, nausea, vomiting that started approximately 3+ hours ago  He states this feels similar to previous MIs that he has had in the past   He is denying any specific chest pain or shortness of breath, however states that he did not have chest pain or shortness of breath that this previous MIs  He has 2 stents  He takes aspirin daily  No other complaints at this time  History provided by:  Patient   used: No    Chest Pain  Pain location:  Substernal area  Pain quality: aching and tightness    Pain radiates to:  Does not radiate  Pain radiates to the back: no    Pain severity:  Mild  Onset quality:  Sudden  Duration:  3 hours  Timing:  Constant  Progression:  Unchanged  Chronicity:  New  Context: at rest    Relieved by:  None tried  Ineffective treatments:  None tried  Associated symptoms: diaphoresis, fatigue, nausea and vomiting    Associated symptoms: no abdominal pain, no anxiety, no back pain, no cough, no dizziness, no fever, no headache and no near-syncope    Risk factors: coronary artery disease, diabetes mellitus, hypertension, male sex and smoking        None       Past Medical History:   Diagnosis Date    Diabetes mellitus ([facility] [address] )     Hypertension        History reviewed  No pertinent surgical history  History reviewed  No pertinent family history  I have reviewed and agree with the history as documented      E-Cigarette/Vaping     E-Cigarette/Vaping Substances     Social History     Tobacco Use    Smoking status: Current Every Day Smoker     Packs/day: 2 00    Smokeless tobacco: Never Used   Substance Use Topics    Alcohol use: Never    Drug use: Yes     Types: Marijuana       Review of Systems   Constitutional: Positive for diaphoresis and fatigue  Negative for fever  Respiratory: Negative for cough  Cardiovascular: Positive for chest pain  Negative for near-syncope  Gastrointestinal: Positive for nausea and vomiting  Negative for abdominal pain  Musculoskeletal: Negative for back pain  Neurological: Negative for dizziness and headaches  All other systems reviewed and are negative  Physical Exam  Physical Exam  Vitals reviewed  Constitutional:       General: He is not in acute distress  Appearance: Normal appearance  He is obese  He is diaphoretic  He is not ill-appearing or toxic-appearing  HENT:      Head: Normocephalic and atraumatic  Right Ear: External ear normal       Left Ear: External ear normal       Mouth/Throat:      Mouth: Mucous membranes are moist       Pharynx: Oropharynx is clear  No oropharyngeal exudate or posterior oropharyngeal erythema  Eyes:      General: No scleral icterus  Right eye: No discharge  Left eye: No discharge  Extraocular Movements: Extraocular movements intact  Conjunctiva/sclera: Conjunctivae normal    Cardiovascular:      Rate and Rhythm: Normal rate and regular rhythm  Pulses: Normal pulses  Heart sounds: Normal heart sounds  No murmur heard  No friction rub  No gallop  Pulmonary:      Effort: Pulmonary effort is normal  No respiratory distress  Breath sounds: Normal breath sounds  No stridor  No wheezing, rhonchi or rales  Abdominal:      General: Abdomen is flat  Palpations: Abdomen is soft  Tenderness: There is no abdominal tenderness  There is no guarding or rebound  Musculoskeletal:         General: Normal range of motion  Cervical back: Normal range of motion and neck supple  Right lower leg: No edema  Left lower leg: No edema     Skin: General: Skin is warm  Capillary Refill: Capillary refill takes less than 2 seconds  Neurological:      General: No focal deficit present  Mental Status: He is alert and oriented to person, place, and time     Psychiatric:         Mood and Affect: Mood normal          Behavior: Behavior normal          Vital Signs  ED Triage Vitals   Temperature Pulse Respirations Blood Pressure SpO2   08/31/22 2322 08/31/22 2322 08/31/22 2322 08/31/22 2322 08/31/22 2322   98 4 °F (36 9 °C) 75 19 123/74 97 %      Temp Source Heart Rate Source Patient Position - Orthostatic VS BP Location FiO2 (%)   08/31/22 2322 08/31/22 2322 08/31/22 2322 08/31/22 2322 --   Oral Monitor Sitting Right arm       Pain Score       09/01/22 0139       No Pain           Vitals:    08/31/22 2322 09/01/22 0100 09/01/22 0200   BP: 123/74 110/67 105/62   Pulse: 75 62 61   Patient Position - Orthostatic VS: Sitting Sitting Lying         Visual Acuity      ED Medications  Medications   ondansetron (FOR EMS ONLY) (ZOFRAN) 4 mg/2 mL injection 4 mg (0 mg Does not apply Given to EMS 8/31/22 2334)   aspirin tablet 325 mg (325 mg Oral Given 8/31/22 2342)   clopidogrel (PLAVIX) tablet 300 mg (300 mg Oral Given 8/31/22 2351)       Diagnostic Studies  Results Reviewed     Procedure Component Value Units Date/Time    HS Troponin I 2hr [[id number]]  (Normal) Collected: 09/01/22 0144    Lab Status: Final result Specimen: Blood from Arm, Left Updated: 09/01/22 0222     hs TnI 2hr 7 ng/L      Delta 2hr hsTnI 0 ng/L     HS Troponin I 4hr [[id number]]     Lab Status: No result Specimen: Blood     HS Troponin 0hr (reflex protocol) [[id number]]  (Normal) Collected: 08/31/22 2341    Lab Status: Final result Specimen: Blood from Arm, Left Updated: 09/01/22 0033     hs TnI 0hr 7 ng/L     Comprehensive metabolic panel [[id number]] Collected: 08/31/22 2341    Lab Status: Final result Specimen: Blood from Arm, Left Updated: 09/01/22 0033     Sodium 138 mmol/L      Potassium 4 2 mmol/L      Chloride 101 mmol/L      CO2 25 mmol/L      ANION GAP 12 mmol/L      BUN 11 mg/dL      Creatinine 1 07 mg/dL      Glucose 127 mg/dL      Calcium 9 4 mg/dL      AST 25 U/L      ALT 45 U/L      Alkaline Phosphatase 74 U/L      Total Protein 8 2 g/dL      Albumin 4 4 g/dL      Total Bilirubin 0 47 mg/dL      eGFR 80 ml/min/1 73sq m     Narrative:      National Kid[employer] Disease Foundation guidelines for Chronic Kidney Disease (CKD):     Stage 1 with normal or high GFR (GFR > 90 mL/min/1 73 square meters)    Stage 2 Mild CKD (GFR = 60-89 mL/min/1 73 square meters)    Stage 3A Moderate CKD (GFR = 45-59 mL/min/1 73 square meters)    Stage 3B Moderate CKD (GFR = 30-44 mL/min/1 73 square meters)    Stage 4 Severe CKD (GFR = 15-29 mL/min/1 73 square meters)    Stage 5 End Stage CKD (GFR <15 mL/min/1 73 square meters)  Note: GFR calculation is accurate only with a steady state creatinine    Protime-INR [[id number]]  (Normal) Collected: 08/31/22 2341    Lab Status: Final result Specimen: Blood from Arm, Left Updated: 09/01/22 0032     Protime 13 2 seconds      INR 1 02    APTT [[id number]]  (Normal) Collected: 08/31/22 2341    Lab Status: Final result Specimen: Blood from Arm, Left Updated: 09/01/22 0032     PTT 26 seconds     CBC and differential [[id number]]  (Abnormal) Collected: 08/31/22 2341    Lab Status: Final result Specimen: Blood from Arm, Left Updated: 08/31/22 2350     WBC 8 85 Thousand/uL      RBC 4 72 Million/uL      Hemoglobin 15 2 g/dL      Hematocrit 44 9 %      MCV 95 fL      MCH 32 2 pg      MCHC 33 9 g/dL      RDW 13 2 %      MPV 11 4 fL      Platelets 075 Thousands/uL      nRBC 0 /100 WBCs      Neutrophils Relative 88 %      Immat GRANS % 1 %      Lymphocytes Relative 3 %      Monocytes Relative 8 %      Eosinophils Relative 0 %      Basophils Relative 0 %      Neutrophils Absolute 7 86 Thousands/µL      Immature Grans Absolute 0 04 Thousand/uL      Lymphocytes Absolute 0 25 Thousands/µL Monocytes Absolute 0 68 Thousand/µL      Eosinophils Absolute 0 00 Thousand/µL      Basophils Absolute 0 02 Thousands/µL                  No orders to display              Procedures  Procedures         ED Course                               SBIRT 22yo+    Flowsheet Row Most Recent Value   SBIRT (25 yo +)    In order to provide better care to our patients, we are screening all of our patients for alcohol and drug use  Would it be okay to ask you these screening questions? Yes Filed at: 08/31/2022 2325   Initial Alcohol Screen: US AUDIT-C     1  How often do you have a drink containing alcohol? 0 Filed at: 08/31/2022 2325   2  How many drinks containing alcohol do you have on a typical day you are drinking? 0 Filed at: 08/31/2022 2325   3a  Male UNDER 65: How often do you have five or more drinks on one occasion? 0 Filed at: 08/31/2022 2325   Audit-C Score 0 Filed at: 08/31/2022 2325   JOSELIN: How many times in the past year have you    Used an illegal drug or used a prescription medication for non-medical reasons? Never Filed at: 08/31/2022 2325                    MDM    Disposition  Final diagnoses:   None     ED Disposition     None      Follow-up Information    None         Patient's Medications    No medications on file       No discharge procedures on file      PDMP Review     None          ED Provider  Electronically Signed by Procedures  Procedures  Conscious Sedation Assessment    Flowsheet Row Classification Score   ASA Scale Assessment 2-Mild to moderate systemic disease, medically well controlled, with no functional limitation filed at 09/01/2022 1421   Mallampati Classification Class II: soft palate, uvula, fauces visible - No Difficulty filed at 09/01/2022 1421             ED Course                               SBIRT 22yo+    Flowsheet Row Most Recent Value   SBIRT (25 yo +)    In order to provide better care to our patients, we are screening all of our patients for alcohol and drug use  Would it be okay to ask you these screening questions? Yes Filed at: 08/31/2022 2325   Initial Alcohol Screen: US AUDIT-C     1  How often do you have a drink containing alcohol? 0 Filed at: 08/31/2022 2325   2  How many drinks containing alcohol do you have on a typical day you are drinking? 0 Filed at: 08/31/2022 2325   3a  Male UNDER 65: How often do you have five or more drinks on one occasion? 0 Filed at: 08/31/2022 2325   Audit-C Score 0 Filed at: 08/31/2022 2325   JOSELIN: How many times in the past year have you    Used an illegal drug or used a prescription medication for non-medical reasons?  Never Filed at: 08/31/2022 2325        CARMEL Risk Score    Flowsheet Row Most Recent Value   Age >= 72 0 Filed at: 09/01/2022 0353   Known CAD (stenosis >= 50%) 1 Filed at: 09/01/2022 0353   Recent (<=24 hrs) Service Angina 1 Filed at: 09/01/2022 0353   ST Deviation >= 0 5 mm 0 Filed at: 09/01/2022 0353   3+ CAD Risk Factors (FHx, HTN, HLP, DM, Smoker) 1 Filed at: 09/01/2022 0353   Aspirin Use Past 7 Days 1 Filed at: 09/01/2022 0353   Elevated Cardiac Markers 0 Filed at: 09/01/2022 0353   CARMEL Risk Score (Calculated) 4 Filed at: 09/01/2022 6791                  MDM  Number of Diagnoses or Management Options  Atypical chest pain  Diagnosis management comments: Patient presenting for evaluation of chest pain, nausea, vomiting, diaphoresis similar to previous MI that he has had in the past   Upon arrival, he appears comfortable  He is diaphoretic  Initial vital signs unremarkable  Patient is diaphoretic on exam, however there are no other concerning findings  Initial EKG with mild elevations in inferior leads  I discussed patient's case with Interventional Cardiology and sent pictures of his EKG who does not believe there is indication for cath lab at this time  Recommend initiating Plavix loading dose  They will evaluate patient in the morning  Lab work reassuring, initial troponin normal   Patient admitted to medicine for further evaluation and management  Currently in stable condition         Amount and/or Complexity of Data Reviewed  Clinical lab tests: ordered and reviewed  Discuss the patient with other providers: yes    Patient Progress  Patient progress: stable      Disposition  Final diagnoses:   Atypical chest pain     Time reflects when diagnosis was documented in both MDM as applicable and the Disposition within this note     Time User Action Codes Description Comment    9/1/2022  2:45 AM Colan Landau Add [R07 89] Atypical chest pain     9/1/2022  4:00 AM Sandra Fry Add [I25 10] CAD (coronary artery disease)     9/1/2022  4:00 AM Sandra Fry Add [I10] HTN (hypertension)     9/1/2022  2:14 PM Wade Ji Add [I25 110] Coronary artery disease involving native coronary artery of native heart with unstable angina pectoris ([address] )     9/1/2022  2:19 PM Alisha Combs Modify [I25 110] Coronary artery disease involving native coronary artery of native heart with unstable angina pectoris ([address] )     9/1/2022  3:22 PM Mona Butt Modify [I25 110] Coronary artery disease involving native coronary artery of native heart with unstable angina pectoris ([address] )     9/1/2022  5:19 PM Ayanna Blakely Modify [I25 110] Coronary artery disease involving native coronary artery of native heart with unstable angina pectoris ([address] ) 9/2/2022 10:52 AM Bayron Michael Modify [I25 110] Coronary artery disease involving native coronary artery of native heart with unstable angina pectoris ([facility] [address] )     9/2/2022 10:52 AM Bayron Michael Add [I24 9] ACS (acute coronary syndrome) [facility])       ED Disposition     ED Disposition   Admit    Condition   Stable    Date/Time   Thu Sep 1, 2022  2:45 AM    Comment   Case was discussed with BRADLEY and the patient's admission status was agreed to be Admission Status: observation status to the service of Dr Sujata Hammond   Follow-up Information     Follow up With Specialties Details Why Contact Info    [employer]  Call Call to establish [phone]      Glorious Carrel, MD Cardiology Schedule an appointment as soon as possible for a visit in 1 week(s)  [address] [place] [address]  [phone]            Discharge Medication List as of 9/2/2022 11:30 AM      START taking these medications    Details   clopidogrel (PLAVIX) 75 mg tablet Take 1 tablet (75 mg total) by mouth daily, Starting Sat 9/3/2022, Normal         CONTINUE these medications which have CHANGED    Details   atorvastatin (LIPITOR) 80 mg tablet Take 1 tablet (80 mg total) by mouth daily at bedtime, Starting Fri 9/2/2022, Normal         CONTINUE these medications which have NOT CHANGED    Details   amLODIPine (NORVASC) 10 mg tablet Take 10 mg by mouth daily, Historical Med      aspirin (ECOTRIN LOW STRENGTH) 81 mg EC tablet Take 81 mg by mouth daily, Historical Med      lisinopril (ZESTRIL) 10 mg tablet Take 10 mg by mouth 2 (two) times a day, Historical Med      metoprolol succinate (TOPROL-XL) 25 mg 24 hr tablet Take 25 mg by mouth daily, Historical Med      spironolactone (ALDACTONE) 25 mg tablet Take 25 mg by mouth daily, Historical Med             No discharge procedures on file      PDMP Review     None          ED Provider  Electronically Signed by           Joycelyn Lawrence PA-C  09/11/22 8098

## 2022-09-02 ENCOUNTER — TELEPHONE (OUTPATIENT)
Dept: CARDIOLOGY CLINIC | Facility: CLINIC | Age: 52
End: 2022-09-02

## 2022-09-02 VITALS
RESPIRATION RATE: 16 BRPM | SYSTOLIC BLOOD PRESSURE: 107 MMHG | HEIGHT: 72 IN | OXYGEN SATURATION: 95 % | HEART RATE: 74 BPM | TEMPERATURE: 98.3 F | BODY MASS INDEX: 34.54 KG/M2 | DIASTOLIC BLOOD PRESSURE: 70 MMHG | WEIGHT: 255 LBS

## 2022-09-02 PROBLEM — I24.9 ACS (ACUTE CORONARY SYNDROME) (HCC): Status: ACTIVE | Noted: 2022-09-01

## 2022-09-02 LAB
BASELINE ST DEPRESSION: 0 MM
CHEST PAIN STATEMENT: NORMAL
MAX DIASTOLIC BP: 86 MMHG
MAX HEART RATE: 110 BPM
MAX HR: 110 BPM
MAX PREDICTED HEART RATE: 170 BPM
MAX. SYSTOLIC BP: 146 MMHG
PROTOCOL NAME: NORMAL
RATE PRESSURE PRODUCT: NORMAL
REASON FOR TERMINATION: NORMAL
SL CV REST NUCLEAR ISOTOPE DOSE: 11 MCI
SL CV STRESS NUCLEAR ISOTOPE DOSE: 33 MCI
SL CV STRESS RECOVERY BP: NORMAL MMHG
SL CV STRESS RECOVERY HR: 72 BPM
SL CV STRESS RECOVERY O2 SAT: 97 %
STRESS ANGINA INDEX: 0
STRESS BASELINE BP: NORMAL MMHG
STRESS BASELINE HR: 83 BPM
STRESS O2 SAT REST: 97 %
STRESS PEAK HR: 110 BPM
STRESS POST O2 SAT PEAK: 100 %
STRESS POST PEAK BP: 146 MMHG
STRESS ST DEPRESSION: 0 MM
TARGET HR FORMULA: NORMAL
TEST INDICATION: NORMAL
TIME IN EXERCISE PHASE: NORMAL

## 2022-09-02 PROCEDURE — 99217 PR OBSERVATION CARE DISCHARGE MANAGEMENT: CPT | Performed by: INTERNAL MEDICINE

## 2022-09-02 PROCEDURE — 99215 OFFICE O/P EST HI 40 MIN: CPT | Performed by: INTERNAL MEDICINE

## 2022-09-02 RX ORDER — ATORVASTATIN CALCIUM 80 MG/1
80 TABLET, FILM COATED ORAL
Qty: 30 TABLET | Refills: 0 | Status: SHIPPED | OUTPATIENT
Start: 2022-09-02 | End: 2022-09-16 | Stop reason: SDUPTHER

## 2022-09-02 RX ORDER — CLOPIDOGREL BISULFATE 75 MG/1
75 TABLET ORAL DAILY
Qty: 30 TABLET | Refills: 0 | Status: SHIPPED | OUTPATIENT
Start: 2022-09-03 | End: 2022-09-16 | Stop reason: SDUPTHER

## 2022-09-02 RX ADMIN — LISINOPRIL 10 MG: 10 TABLET ORAL at 09:57

## 2022-09-02 RX ADMIN — METOPROLOL SUCCINATE 25 MG: 25 TABLET, EXTENDED RELEASE ORAL at 09:59

## 2022-09-02 RX ADMIN — CLOPIDOGREL BISULFATE 75 MG: 75 TABLET ORAL at 09:57

## 2022-09-02 RX ADMIN — AMLODIPINE BESYLATE 10 MG: 10 TABLET ORAL at 09:57

## 2022-09-02 RX ADMIN — ASPIRIN 81 MG: 81 TABLET, COATED ORAL at 09:57

## 2022-09-02 RX ADMIN — SPIRONOLACTONE 25 MG: 25 TABLET ORAL at 09:56

## 2022-09-02 RX ADMIN — HEPARIN SODIUM 5000 UNITS: 5000 INJECTION INTRAVENOUS; SUBCUTANEOUS at 06:19

## 2022-09-02 RX ADMIN — NICOTINE 1 PATCH: 21 PATCH, EXTENDED RELEASE TRANSDERMAL at 06:16

## 2022-09-02 NOTE — DISCHARGE SUMMARY
[address]  Discharge- Stefania Smith 1970, 50 y o  male MRN: [id number]  Unit/Bed#: -01 Encounter: [id number]  Primary Care Provider: No primary care provider on file  Date and time admitted to hospital: 8/31/2022 11:18 PM    * ACS (acute coronary syndrome) [facility])  Assessment & Plan  · Presented initially with substernal chest pain  · EKG showed no signs ischemia  · Troponins negative  · Had a cardiac catheterization performed given substernal chest pain at rest  · Noted to have acute occlusion had 2 stents placed in LAD  · Started on dual anti-platelet therapy as well as beta-blocker and statin  · Will follow-up outpatient with Cardiology    Diabetes mellitus, type 2 [facility])  Assessment & Plan  Lab Results   Component Value Date    HGBA1C 5 8 (H) 08/31/2022       Recent Labs     09/01/22  0632 09/01/22  1730 09/01/22  2102 09/01/22  2320   POCGLU 88 107 90 98       Blood Sugar Average: Last 72 hrs:  (P) 95 75   Patient prediabetic  Diet control      Discharging Physician / Practitioner: Marvin Loja MD  PCP: No primary care provider on file  Admission Date:   Admission Orders (From admission, onward)     Ordered        09/01/22 0246  Place in Observation  Once                      Discharge Date: 09/02/22    Medical Problems             Resolved Problems  Date Reviewed: 9/2/2022   None                 Consultations During Hospital Stay:  · cardiology    Procedures Performed:   · Cardiac cath    Significant Findings / Test Results:   XR chest portable    Result Date: 9/1/2022  · Impression: No acute cardiopulmonary disease   Workstation performed: [id number]     Incidental Findings:   · none     Test Results Pending at Discharge (will require follow up):   · none     Outpatient Tests Requested:  · none    Complications:  none    Reason for Admission:  Chest pain    Hospital Course:     Stefania Smith is a 50 y o  male patient who originally presented to the hospital on 8/31/2022 with past medical history significant hyperlipidemia, coronary artery disease status post PCI x2 initially presented with substernal chest pain  Noted to have chest pain at rest   Underwent cardiac catheterization which showed acute occlusion of LAD  Had PCI performed x2  Started on dual antiplatelet therapy  Will follow-up outpatient with Cardiology  Please see above list of diagnoses and related plan for additional information  Condition at Discharge: stable     Discharge Day Visit / Exam:     Subjective:  Denies chest pain, shortness breath, cough, fevers  Vitals: Blood Pressure: 116/74 (09/02/22 1001)  Pulse: 74 (09/01/22 2200)  Temperature: 98 3 °F (36 8 °C) (09/02/22 0500)  Temp Source: Oral (09/02/22 0500)  Respirations: 17 (09/02/22 0728)  Height: 6' (182 9 cm) (09/01/22 0945)  Weight - Scale: 116 kg (255 lb) (09/01/22 0945)  SpO2: 95 % (09/02/22 0500)  Exam:   Physical Exam  Constitutional:       General: He is not in acute distress  Appearance: He is well-developed  He is not diaphoretic  HENT:      Head: Normocephalic and atraumatic  Nose: Nose normal       Mouth/Throat:      Pharynx: No oropharyngeal exudate  Eyes:      General: No scleral icterus  Conjunctiva/sclera: Conjunctivae normal    Cardiovascular:      Rate and Rhythm: Normal rate and regular rhythm  Heart sounds: Normal heart sounds  No murmur heard  No friction rub  No gallop  Pulmonary:      Effort: Pulmonary effort is normal  No respiratory distress  Breath sounds: Normal breath sounds  No wheezing or rales  Chest:      Chest wall: No tenderness  Abdominal:      General: Bowel sounds are normal  There is no distension  Palpations: Abdomen is soft  Tenderness: There is no abdominal tenderness  There is no guarding  Musculoskeletal:         General: No tenderness or deformity  Normal range of motion  Cervical back: Normal range of motion and neck supple     Skin:     General: Skin is warm and dry  Findings: No erythema  Neurological:      Mental Status: He is alert  Mental status is at baseline  Discussion with Family: pt    Discharge instructions/Information to patient and family:   See after visit summary for information provided to patient and family  Provisions for Follow-Up Care:  See after visit summary for information related to follow-up care and any pertinent home health orders  Disposition:     Home    For Discharges to [facility]:   · Not Applicable to this Patient - Not Applicable to this Patient    Planned Readmission: none     Discharge Statement:  I spent 60 minutes discharging the patient  This time was spent on the day of discharge  I had direct contact with the patient on the day of discharge  Greater than 50% of the total time was spent examining patient, answering all patient questions, arranging and discussing plan of care with patient as well as directly providing post-discharge instructions  Additional time then spent on discharge activities  Discharge Medications:  See after visit summary for reconciled discharge medications provided to patient and family        ** Please Note: This note has been constructed using a voice recognition system **

## 2022-09-02 NOTE — PROGRESS NOTES
Cardiology Progress Note   Tom Brain 50 y o  male MRN: [id number]    Unit/Bed#: -01 Encounter: [id number]      Assessment:  1  CAD s/p PCI x2 (2015 and 2018), s/p GARRICK x2 to mLAD (9/1/2022)  2  Hypertension   3  Hyperlipidemia   4  DM2 - A1c 5 8%  5  Heavy tobacco use     Plan:  Continue DAPT with ASA and Plavix for at least 1 year post-PCI   Continue atorvastatin 80mg QD   Continue Toprol XL 25mg, lisinopril 10mg and aldactone 25mg QD  Continue SL NTG PRN  Maintain a low-salt, low-cholesterol cardiac diet  Smoking cessation was strongly advised  Will need 1 week follow up in office and cardiac rehab     Diagnostics:  HS troponin trend 8/31-9/1/22: 7>7>8  EKG 9/1/22: NSR, left axis deviation, low-voltage QRS   TTE 8/31/22: EF 55%, mild hypokinesis of the basal inferior, mildly G1DD  Cardiac catheterization 09/01/22: LAD shows diffuse mild luminal irregularities, prior proximal LAD stent patent with mild InStent restenosis, mid LAD shows 50% stenosis receiving IFR which was physiologically significant at 0 77  Mid LAD lesion was addressed with GARRICK x2 in overlapping fashion  Residual IFR was 0 95  Subjective:   Patient seen and examined  Patient feeling well overall  No acute complaints at present  Objective:     Vitals: Blood pressure 107/70, pulse 74, temperature 98 3 °F (36 8 °C), temperature source Oral, resp  rate 16, height 6' (1 829 m), weight 116 kg (255 lb), SpO2 95 %  , Body mass index is 34 58 kg/m² ,   Orthostatic Blood Pressures    Flowsheet Row Most Recent Value   Blood Pressure 107/70 filed at 09/02/2022 1104   Patient Position - Orthostatic VS Lying filed at 09/02/2022 0300          Intake/Output Summary (Last 24 hours) at 9/2/2022 1113  Last data filed at 9/2/2022 0100  Gross per 24 hour   Intake 240 ml   Output 755 ml   Net -515 ml     Physical Exam:  GEN: Tom Brain appears well, alert and oriented x 3, pleasant and cooperative   HEENT: Sclera anicteric, conjunctivae pink, mucous membranes moist  Oropharynx clear  NECK: supple, no significant JVD, Trachea midline, no thyromegaly  HEART: regular rhythm, normal S1 and S2, no murmurs, clicks, gallops or rubs   LUNGS: clear to auscultation bilaterally; no wheezes, rales, or rhonchi  No increased work of breathing or signs of respiratory distress  ABDOMEN: Soft, nontender, nondistended  EXTREMITIES: +R radial band site clean and dry without sign of hematoma or bleeding  Skin warm and well perfused, no clubbing, cyanosis, or edema  NEURO: No focal findings  Normal speech  Mood and affect normal    SKIN: Normal without suspicious lesions on exposed skin      Medications:    Current Facility-Administered Medications:     acetaminophen (TYLENOL) tablet 650 mg, 650 mg, Oral, Q6H PRN, Shelly Gill PA-C    aluminum-magnesium hydroxide-simethicone (MYLANTA) oral suspension 30 mL, 30 mL, Oral, Q6H PRN, Shelly Gill PA-C    amLODIPine (NORVASC) tablet 10 mg, 10 mg, Oral, Daily, Beverley Bay PA-C, 10 mg at 09/02/22 0957    aspirin (ECOTRIN LOW STRENGTH) EC tablet 81 mg, 81 mg, Oral, Daily, Beverley Bay PA-C, 81 mg at 09/02/22 0957    atorvastatin (LIPITOR) tablet 80 mg, 80 mg, Oral, HS, Beverley Bay PA-C, 80 mg at 09/01/22 2134    clopidogrel (PLAVIX) tablet 75 mg, 75 mg, Oral, Daily, Beverley Bay PA-C, 75 mg at 09/02/22 0957    heparin (porcine) subcutaneous injection 5,000 Units, 5,000 Units, Subcutaneous, Q8H [address], Shelly Gill PA-C, 5,000 Units at 09/02/22 6028    lidocaine (LIDODERM) 5 % patch 1 patch, 1 patch, Topical, Daily PRN, Shelly Gill PA-C    lisinopril (ZESTRIL) tablet 10 mg, 10 mg, Oral, BID, Beverley Bay PA-C, 10 mg at 09/02/22 0957    metoprolol succinate (TOPROL-XL) 24 hr tablet 25 mg, 25 mg, Oral, Daily, Beverley Bay PA-C, 25 mg at 09/02/22 0959    nicotine (NICODERM CQ) 21 mg/24 hr TD 24 hr patch 1 patch, 1 patch, Transdermal, Daily, Shelly WOODY Gill, 1 patch at 09/02/22 0272   nitroglycerin (NITROSTAT) SL tablet 0 4 mg, 0 4 mg, Sublingual, Q5 Min PRN, Jazzmine Mathew PA-C    ondansetron [facility]) injection 4 mg, 4 mg, Intravenous, Q6H PRN, Jazzmine Mathew PA-C    spironolactone (ALDACTONE) tablet 25 mg, 25 mg, Oral, Daily, Beverley Bay PA-C, 25 mg at 09/02/22 0956     Labs & Results:        Results from last 7 days   Lab Units 08/31/22  2341   WBC Thousand/uL 8 85   HEMOGLOBIN g/dL 15 2   HEMATOCRIT % 44 9   PLATELETS Thousands/uL 172         Results from last 7 days   Lab Units 08/31/22  2341   POTASSIUM mmol/L 4 2   CHLORIDE mmol/L 101   CO2 mmol/L 25   BUN mg/dL 11   CREATININE mg/dL 1 07   CALCIUM mg/dL 9 4   ALK PHOS U/L 74   ALT U/L 45   AST U/L 25     Results from last 7 days   Lab Units 08/31/22  2341   INR  1 02   PTT seconds 26

## 2022-09-02 NOTE — TELEPHONE ENCOUNTER
This patient can be seen in 7 to 10 days by 1 of the APs for hospital follow-up  Subsequently he can make an appointment to see me

## 2022-09-02 NOTE — PLAN OF CARE
Problem: PAIN - ADULT  Goal: Verbalizes/displays adequate comfort level or baseline comfort level  Description: Interventions:  - Encourage patient to monitor pain and request assistance  - Assess pain using appropriate pain scale  - Administer analgesics based on type and severity of pain and evaluate response  - Implement non-pharmacological measures as appropriate and evaluate response  - Consider cultural and social influences on pain and pain management  - Notify physician/advanced practitioner if interventions unsuccessful or patient reports new pain  Outcome: Progressing     Problem: INFECTION - ADULT  Goal: Absence or prevention of progression during hospitalization  Description: INTERVENTIONS:  - Assess and monitor for signs and symptoms of infection  - Monitor lab/diagnostic results  - Monitor all insertion sites, i e  indwelling lines, tubes, and drains  - Monitor endotracheal if appropriate and nasal secretions for changes in amount and color  - [place] appropriate cooling/warming therapies per order  - Administer medications as ordered  - Instruct and encourage patient and family to use good hand hygiene technique  - Identify and instruct in appropriate isolation precautions for identified infection/condition  Outcome: Progressing     Problem: INFECTION - ADULT  Goal: Absence of fever/infection during neutropenic period  Description: INTERVENTIONS:  - Monitor WBC    Outcome: Progressing     Problem: SAFETY ADULT  Goal: Maintain or return to baseline ADL function  Description: INTERVENTIONS:  -  Assess patient's ability to carry out ADLs; assess patient's baseline for ADL function and identify physical deficits which impact ability to perform ADLs (bathing, care of mouth/teeth, toileting, grooming, dressing, etc )  - Assess/evaluate cause of self-care deficits   - Assess range of motion  - Assess patient's mobility; develop plan if impaired  - Assess patient's need for assistive devices and provide as appropriate  - Encourage maximum independence but intervene and supervise when necessary  - Involve family in performance of ADLs  - Assess for home care needs following discharge   - Consider OT consult to assist with ADL evaluation and planning for discharge  - Provide patient education as appropriate  Outcome: Progressing     Problem: SAFETY ADULT  Goal: Maintains/Returns to pre admission functional level  Description: INTERVENTIONS:  - Perform BMAT or MOVE assessment daily    - Set and communicate daily mobility goal to care team and patient/family/caregiver  - Collaborate with rehabilitation services on mobility goals if consulted  - Perform Range of Motion 3 times a day  - Reposition patient every 2 hours  - Dangle patient 3 times a day  - Stand patient 3 times a day  - Ambulate patient 3 times a day  - Out of bed to chair 3 times a day   - Out of bed for meals 3 times a day  - Out of bed for toileting  - Record patient progress and toleration of activity level   Outcome: Progressing     Problem: DISCHARGE PLANNING  Goal: Discharge to home or other facility with appropriate resources  Description: INTERVENTIONS:  - Identify barriers to discharge w/patient and caregiver  - Arrange for needed discharge resources and transportation as appropriate  - Identify discharge learning needs (meds, wound care, etc )  - Arrange for interpretive services to assist at discharge as needed  - Refer to Case Management Department for coordinating discharge planning if the patient needs post-hospital services based on physician/advanced practitioner order or complex needs related to functional status, cognitive ability, or social support system  Outcome: Progressing     Problem: Knowledge Deficit  Goal: Patient/family/caregiver demonstrates understanding of disease process, treatment plan, medications, and discharge instructions  Description: Complete learning assessment and assess knowledge base    Interventions:  - Provide teaching at level of understanding  - Provide teaching via preferred learning methods  Outcome: Progressing

## 2022-09-02 NOTE — ASSESSMENT & PLAN NOTE
· Presented initially with substernal chest pain  · EKG showed no signs ischemia  · Troponins negative  · Had a cardiac catheterization performed given substernal chest pain at rest  · Noted to have acute occlusion had 2 stents placed in LAD  · Started on dual anti-platelet therapy as well as beta-blocker and statin  · Will follow-up outpatient with Cardiology

## 2022-09-02 NOTE — UTILIZATION REVIEW
Initial Clinical Review    Admission: Date/Time/Statement:   Admission Orders (From admission, onward)     Ordered        09/01/22 0246  Place in Observation  Once                      Orders Placed This Encounter   Procedures    Place in Observation     Standing Status:   Standing     Number of Occurrences:   1     Order Specific Question:   Level of Care     Answer:   Med Surg [16]     ED Arrival Information     Expected   -    Arrival   8/31/2022 23:18    Acuity   Emergent            Means of arrival   Ambulance    Escorted by   [id number]  [facility]    Service   Hospitalist    Admission type   Emergency            Arrival complaint   medical problem           Chief Complaint   Patient presents with    Chest Pain     Pt arrives via EMS after 3+ hrs of severe diaphoresis, cardiac hx previous MI, states "it feels like my last heart attack"       Initial Presentation: 50 y o  male to ED from home via EMS   sudden onset all over chest pressure/discomfort that started this morning but gradually became much worse  Then patient reports 8:30 p m  He had an episode of being very diaphoretic along with the chest pressure and chills  PMHX CAD s/p stent placement , HTN , DM   Admitted OBS status w/ atypical CP plan for cardiology consult , serial trop , NPO , asa, plavix , sl ntg , lidocaine patch   9/1 Cardiology Consult   CP concerning for possible angina   Trend trop and start heparin if elevated   Load w/ plavix and asa  Cont norvasc, statin , lisinopril , toprol , aldactone, tele , serial ekgs           ED Triage Vitals   Temperature Pulse Respirations Blood Pressure SpO2   08/31/22 2322 08/31/22 2322 08/31/22 2322 08/31/22 2322 08/31/22 2322   98 4 °F (36 9 °C) 75 19 123/74 97 %      Temp Source Heart Rate Source Patient Position - Orthostatic VS BP Location FiO2 (%)   08/31/22 2322 08/31/22 2322 08/31/22 2322 08/31/22 2322 --   Oral Monitor Sitting Right arm       Pain Score       09/01/22 0139       No Pain Wt Readings from Last 1 Encounters:   09/01/22 116 kg (255 lb)     Additional Vital Signs:   09/02/22 07:28:42 -- -- 17 133/79 97 -- -- --   09/02/22 0500 98 3 °F (36 8 °C) -- 18 121/74 90 95 % None (Room air) --   09/02/22 0300 98 5 °F (36 9 °C) -- -- 126/71 91 96 % None (Room air) Lying   09/02/22 0100 98 4 °F (36 9 °C) -- -- 126/79 93 98 % None (Room air) --   09/02/22 0000 98 3 °F (36 8 °C) -- -- 119/73 86 95 % None (Room air) --   09/01/22 2300 98 3 °F (36 8 °C) -- -- 120/75 89 95 % None (Room air) --   09/01/22 2247 -- -- -- 112/73 86 -- -- --   09/01/22 2215 -- -- -- 122/76 91 -- -- --   09/01/22 2200 -- 74 18 124/75 90 97 % None (Room air) Lying   09/01/22 2146 -- -- -- 132/80 97 -- -- --   09/01/22 2100 -- 82 18 122/77 92 95 % None (Room air) Lying   09/01/22 2030 -- 71 18 120/75 90 93 % None (Room air) Lying   09/01/22 2000 -- 72 18 124/77 93 96 % None (Room air) Lying   09/01/22 1930 -- 70 18 127/72 90 95 % None (Room air) Lying   09/01/22 1915 99 2 °F (37 3 °C) 70 18 122/74 90 95 % None (Room air) Lying   09/01/22 1900 99 2 °F (37 3 °C) 75 18 114/76 89 95 % None (Room air) Lying   09/01/22 17:04:26 99 4 °F (37 4 °C) -- 18 130/88 102 -- -- --   09/01/22 14:23:50 -- -- -- -- -- -- Nasal cannula --   09/01/22 1230 -- 72 20 115/60 -- 94 % None (Room air) Lying   09/01/22 1100 -- 73 18 122/74 94 94 % None (Room air) Sitting   09/01/22 0945 -- 82 18 102/53 74 92 % None (Room air) Sitting   09/01/22 0845 -- 74 18 108/59 81 96 % None (Room air) Sitting   09/01/22 0842 -- 75 -- 110/62 -- -- -- --   09/01/22 0500 -- 64 17 109/55 79 93 % None (Room air) Lying   09/01/22 0400 -- 66 18 108/61 78 91 % None (Room air) Lying   09/01/22 0300 -- 63 22 107/58 80 95 % None (Room air) Lying   09/01/22 0200 -- 61 21 105/62 78 93 % None (Room air) Lying   09/01/22 0100 -- 62 22 110/67 84 94 % None (Room air) Sitting   08/31/22 2326 -- -- -- -- -- -- None (Room         Pertinent Labs/Diagnostic Test Results:   · 9/1 Cardiac Cath The left main arises from the left coronary cusp and divides into left anterior descending and circumflex arteries  Left main is without angiographically significant obstructive disease  · The left anterior descending artery shows diffuse mild luminal irregularities  Previously placed proximal LAD stent is patent with only mild InStent restenoses  The mid LAD shows 50% stenosis  · The circumflex artery shows minimal luminal irregularities  · The right coronary artery is a large dominant vessel with mild luminal irregularities  · LVEDP is within normal limits  No significant gradient upon pullback across the aortic valve  · IFR was measured across the mid LAD lesion, this was found to be physiologically significant at 0 77  · The mid LAD lesion was addressed with placement of a of a drug-eluting stent (brady 2 75 x 38 mm) which was post dilated with a 3 0 noncompliant balloon  IFR improved in the proximal and mid LAD however just past the stent IFR was 0 89, at this point a 2nd GARRICK (brady 2 75 x 12 mm) was deployed in overlapping fashion  Residual IFR beyond the mid LAD was 0 95  · The previously placed proximal LAD stent also received balloon angioplasty with a 3 0 diameter noncompliant balloon when overlapping from the mid LAD stents  Selective right and left coronary angiography via right radial access  Left heart pressures  IFR of the proximal and mid LAD  Percutaneous coronary intervention to the mid LAD with 2 overlapping drug-eluting stents      Patient tolerated procedure well  Continue optimal medical therapy as hemodynamically tolerated  Dual anti platelet therapy for at least a year  Smoking cessation advised    XR chest portable   Final Result by Robi Aldridge MD (09/01 0685)      No acute cardiopulmonary disease                    Workstation performed: [id number]             9/1 Echo   Left Ventricle: Left ventricular cavity size is normal  Wall thickness is mildly increased  The left ventricular ejection fraction is 55%  Systolic function is normal   Mild hypokinesis of the basal inferior wall  Diastolic function is mildly abnormal, consistent with grade I (abnormal) relaxation    9/1 EKG NSR   Results from last 7 days   Lab Units 08/31/22  2341   WBC Thousand/uL 8 85   HEMOGLOBIN g/dL 15 2   HEMATOCRIT % 44 9   PLATELETS Thousands/uL 172   NEUTROS ABS Thousands/µL 7 86*     Results from last 7 days   Lab Units 08/31/22  2341   SODIUM mmol/L 138   POTASSIUM mmol/L 4 2   CHLORIDE mmol/L 101   CO2 mmol/L 25   ANION GAP mmol/L 12   BUN mg/dL 11   CREATININE mg/dL 1 07   EGFR ml/min/1 73sq m 80   CALCIUM mg/dL 9 4     Results from last 7 days   Lab Units 08/31/22  2341   AST U/L 25   ALT U/L 45   ALK PHOS U/L 74   TOTAL PROTEIN g/dL 8 2   ALBUMIN g/dL 4 4   TOTAL BILIRUBIN mg/dL 0 47     Results from last 7 days   Lab Units 09/01/22  2320 09/01/22  2102 09/01/22  1730 09/01/22  0632   POC GLUCOSE mg/dl 98 90 107 88     Results from last 7 days   Lab Units 08/31/22  2341   GLUCOSE RANDOM mg/dL 127     Results from last 7 days   Lab Units 08/31/22  2341   HEMOGLOBIN A1C % 5 8*   EAG mg/dl 120       Results from last 7 days   Lab Units 09/01/22  0421 09/01/22  0144 08/31/22  2341   HS TNI 0HR ng/L  --   --  7   HS TNI 2HR ng/L  --  7  --    HSTNI D2 ng/L  --  0  --    HS TNI 4HR ng/L 8  --   --    HSTNI D4 ng/L 1  --   --      Results from last 7 days   Lab Units 08/31/22  2341   PROTIME seconds 13 2   INR  1 02   PTT seconds 26       ED Treatment:   Medication Administration from 08/31/2022 2318 to 09/01/2022 1420       Date/Time Order Dose Route Action     08/31/2022 2334 ondansetron (FOR EMS ONLY) (ZOFRAN) 4 mg/2 mL injection 4 mg 0 mg Does not apply Given to EMS     08/31/2022 2342 aspirin tablet 325 mg 325 mg Oral Given     08/31/2022 2351 clopidogrel (PLAVIX) tablet 300 mg 300 mg Oral Given     09/01/2022 0837 aspirin (ECOTRIN LOW STRENGTH) EC tablet 81 mg 81 mg Oral Given 09/01/2022 0837 clopidogrel (PLAVIX) tablet 75 mg 75 mg Oral Given     09/01/2022 0731 nicotine (NICODERM CQ) 21 mg/24 hr TD 24 hr patch 1 patch 1 patch Transdermal Medication Applied     09/01/2022 1401 regadenoson (LEXISCAN) injection 0 4 mg 0 4 mg Intravenous Given     09/01/2022 1406 aminophylline injection 50 mg 50 mg Intravenous Given        Past Medical History:   Diagnosis Date    Diabetes mellitus ([facility] )     Hypertension      Present on Admission:   Diabetes mellitus, type 2 ([facility] )   Atypical chest pain      Admitting Diagnosis: Chest pain [R07 9]  HTN (hypertension) [I10]  CAD (coronary artery disease) [I25 10]  Atypical chest pain [R07 89]  Coronary artery disease involving native coronary artery of native heart with unstable angina pectoris ([facility] ) [I25 110]  Age/Sex: 50 y o  male  Admission Orders:  Scheduled Medications:  amLODIPine, 10 mg, Oral, Daily  aspirin, 81 mg, Oral, Daily  atorvastatin, 80 mg, Oral, HS  clopidogrel, 75 mg, Oral, Daily  heparin (porcine), 5,000 Units, Subcutaneous, Q8H DWIGHT  lisinopril, 10 mg, Oral, BID  metoprolol succinate, 25 mg, Oral, Daily  nicotine, 1 patch, Transdermal, Daily  spironolactone, 25 mg, Oral, Daily      Continuous IV Infusions:     PRN Meds:  acetaminophen, 650 mg, Oral, Q6H PRN  aluminum-magnesium hydroxide-simethicone, 30 mL, Oral, Q6H PRN  lidocaine, 1 patch, Topical, Daily PRN  nitroglycerin, 0 4 mg, Sublingual, Q5 Min PRN  ondansetron, 4 mg, Intravenous, Q6H PRN    Fingerstick q6hr  Tele     IP CONSULT TO CARDIOLOGY    Network Utilization Review Department  ATTENTION: Please call with any questions or concerns to [phone] and carefully listen to the prompts so that you are directed to the right person  All voicemails are confidential   Cleotis Dirk all requests for admission clinical reviews, approved or denied determinations and any other requests to dedicated fax number below belonging to the campus where the patient is receiving treatment   List of dedicated fax numbers for the Facilities:  [address] [place] [address] DENIALS (Administrative/Medical Necessity) [phone]   [address] (Maternity/NICU/Pediatrics) [phone]   [address]  [address] [place] [address] [address] [place] [address] [address] [place] [address] [facility] [place] [address] [phone]

## 2022-09-02 NOTE — ASSESSMENT & PLAN NOTE
Lab Results   Component Value Date    HGBA1C 5 8 (H) 08/31/2022       Recent Labs     09/01/22  0632 09/01/22  1730 09/01/22  2102 09/01/22  2320   POCGLU 88 107 90 98       Blood Sugar Average: Last 72 hrs:  (P) 95 75   Patient prediabetic  Diet control

## 2022-09-02 NOTE — TELEPHONE ENCOUNTER
Patient called and stated that he needed a 1 week HOSP F/UP with Dr Ta Kamara  Pt stated he was advised at the hospital       Please Advise

## 2022-09-06 DIAGNOSIS — I10 PRIMARY HYPERTENSION: Primary | ICD-10-CM

## 2022-09-06 LAB
KCT BLD-ACNC: 182 SEC (ref 89–137)
SPECIMEN SOURCE: ABNORMAL

## 2022-09-06 RX ORDER — SPIRONOLACTONE 25 MG/1
25 TABLET ORAL DAILY
Qty: 90 TABLET | Refills: 1 | Status: SHIPPED | OUTPATIENT
Start: 2022-09-06 | End: 2022-09-16 | Stop reason: SDUPTHER

## 2022-09-10 DIAGNOSIS — I10 PRIMARY HYPERTENSION: Primary | ICD-10-CM

## 2022-09-12 RX ORDER — LISINOPRIL 10 MG/1
TABLET ORAL
Qty: 60 TABLET | Refills: 0 | Status: SHIPPED | OUTPATIENT
Start: 2022-09-12 | End: 2022-09-16 | Stop reason: ALTCHOICE

## 2022-09-12 NOTE — TELEPHONE ENCOUNTER
Requested medication(s) are due for refill today: Yes  Patient has already received a courtesy refill: No  Other reason request has been forwarded to provider: will be seen in [address] [place] [address]

## 2022-09-16 ENCOUNTER — OFFICE VISIT (OUTPATIENT)
Dept: CARDIOLOGY CLINIC | Facility: CLINIC | Age: 52
End: 2022-09-16

## 2022-09-16 VITALS
OXYGEN SATURATION: 98 % | DIASTOLIC BLOOD PRESSURE: 70 MMHG | SYSTOLIC BLOOD PRESSURE: 100 MMHG | WEIGHT: 250 LBS | HEART RATE: 84 BPM | HEIGHT: 72 IN | BODY MASS INDEX: 33.86 KG/M2 | RESPIRATION RATE: 16 BRPM

## 2022-09-16 DIAGNOSIS — I24.9 ACS (ACUTE CORONARY SYNDROME) (HCC): ICD-10-CM

## 2022-09-16 DIAGNOSIS — I25.10 CORONARY ARTERY DISEASE INVOLVING NATIVE CORONARY ARTERY OF NATIVE HEART WITHOUT ANGINA PECTORIS: Primary | ICD-10-CM

## 2022-09-16 DIAGNOSIS — E78.2 MIXED HYPERLIPIDEMIA: ICD-10-CM

## 2022-09-16 DIAGNOSIS — I10 PRIMARY HYPERTENSION: ICD-10-CM

## 2022-09-16 DIAGNOSIS — E11.9 TYPE 2 DIABETES MELLITUS WITHOUT COMPLICATION, WITHOUT LONG-TERM CURRENT USE OF INSULIN (HCC): ICD-10-CM

## 2022-09-16 PROCEDURE — 99213 OFFICE O/P EST LOW 20 MIN: CPT | Performed by: NURSE PRACTITIONER

## 2022-09-16 RX ORDER — CLOPIDOGREL BISULFATE 75 MG/1
75 TABLET ORAL DAILY
Qty: 90 TABLET | Refills: 3 | Status: SHIPPED | OUTPATIENT
Start: 2022-09-16

## 2022-09-16 RX ORDER — ASPIRIN 81 MG/1
81 TABLET ORAL DAILY
Qty: 90 TABLET | Refills: 3 | Status: SHIPPED | OUTPATIENT
Start: 2022-09-16

## 2022-09-16 RX ORDER — METOPROLOL SUCCINATE 25 MG/1
25 TABLET, EXTENDED RELEASE ORAL DAILY
Qty: 90 TABLET | Refills: 3 | Status: SHIPPED | OUTPATIENT
Start: 2022-09-16

## 2022-09-16 RX ORDER — AMLODIPINE BESYLATE 10 MG/1
10 TABLET ORAL DAILY
Qty: 90 TABLET | Refills: 3 | Status: SHIPPED | OUTPATIENT
Start: 2022-09-16

## 2022-09-16 RX ORDER — ATORVASTATIN CALCIUM 80 MG/1
80 TABLET, FILM COATED ORAL
Qty: 90 TABLET | Refills: 3 | Status: SHIPPED | OUTPATIENT
Start: 2022-09-16

## 2022-09-16 RX ORDER — SPIRONOLACTONE 25 MG/1
25 TABLET ORAL DAILY
Qty: 90 TABLET | Refills: 3 | Status: SHIPPED | OUTPATIENT
Start: 2022-09-16

## 2022-09-16 RX ORDER — LISINOPRIL 10 MG/1
10 TABLET ORAL DAILY
Qty: 90 TABLET | Refills: 3 | Status: SHIPPED | OUTPATIENT
Start: 2022-09-16

## 2022-09-16 NOTE — PROGRESS NOTES
Cardiology Office Note    Jesus Kemp 50 y o  male MRN: [id number]    09/16/22          Assessment/Plan:    1  Coronary artery disease s/p remote PCI and GARRICK x2  -denies chest pain, diaphoresis, shortness of breath or other anginal equivalent  -most recent GARRICK x2 to the mid LAD earlier this month  -continue aspirin, atorvastatin, Plavix, metoprolol succinate  -recommend low-fat/low-cholesterol diet    2  Hypertension  -blood pressure has been running on the softer side  -continue lisinopril, but decreased to 10 mg daily  -continue amlodipine, spironolactone and metoprolol succinate  -patient instructed to monitor his blood pressure at home    3  Hyperlipidemia  -continue atorvastatin    4  Diabetes type 2  -well controlled  -last hemoglobin A1c noted to be 5 8    Follow up: 3 months or sooner as needed    1  Coronary artery disease involving native coronary artery of native heart without angina pectoris     2  Mixed hyperlipidemia     3  Primary hypertension     4  Type 2 diabetes mellitus without complication, without long-term current use of insulin (HCC)         HPI: Jesus Kemp is a 50y o  year old male with past medical history of CAD s/p remote PCI and GARRICK x2 to mLAD, heavy tobacco abuse, hypertension, hyperlipidemia and diabetes type 2 who presents today for hospital follow-up  Patient was admitted to [facility] [place] earlier this month with complaints of episodes of diaphoresis alternating with episodes of chills, which was what he experienced during his 1st heart attack  He was ordered a pharmacological stress test and during that he became hypotensive and was urgently taken for cardiac catheterization  There he was discovered to have mid LAD stenosis and received GARRICK x2  A TTE also performed at that time showed normal systolic function with an EF 55%, no regional wall motion abnormalities, and grade 1 diastolic dysfunction  Today he states that he is doing well    He denies any further episodes of diaphoresis and chills, dyspnea on exertion or rest, lower extremity edema, or palpitations and was instructed to call  the office or seek medical attention if any such symptoms develop  Unfortunately, he is still smoking but has cut back from 2 packs per day to 1 pack a day and is working to his goal of totally quitting  His blood pressure today is running on the softer side at 100/70, and per record review appears to have been also running on the lower side at his last blood pressure check  He was instructed to decrease his lisinopril to 10 mg daily and to monitor his blood pressure at home  All medications reviewed and patient is tolerating medications without side effects  Family History:  Father with history of hypertension  Mother with history of coronary artery disease and heart failure    Social history:  Current tobacco E user, has cut down from 2 packs per day to 1 pack per day      Patient Active Problem List   Diagnosis    Hypertension    Coronary artery disease    [facility], [facility]    ACS (acute coronary syndrome) ([facility])       No Known Allergies      Current Outpatient Medications:     amLODIPine (NORVASC) 10 mg tablet, Take 10 mg by mouth daily, Disp: , Rfl:     aspirin (ECOTRIN LOW STRENGTH) 81 mg EC tablet, Take 81 mg by mouth daily, Disp: , Rfl:     atorvastatin (LIPITOR) 80 mg tablet, Take 1 tablet (80 mg total) by mouth daily at bedtime, Disp: 30 tablet, Rfl: 0    clopidogrel (PLAVIX) 75 mg tablet, Take 1 tablet (75 mg total) by mouth daily, Disp: 30 tablet, Rfl: 0    lisinopril (ZESTRIL) 10 mg tablet, Take 1 tablet by mouth twice daily  **Please schedule an office visit for future refills  **, Disp: 60 tablet, Rfl: 0    metoprolol succinate (TOPROL-XL) 25 mg 24 hr tablet, Take 25 mg by mouth daily, Disp: , Rfl:     spironolactone (ALDACTONE) 25 mg tablet, Take 1 tablet (25 mg total) by mouth daily, Disp: 90 tablet, Rfl: 1    Past Medical History: Diagnosis Date    Diabetes mellitus ([facility] )     Hypertension        No family history on file  Past Surgical History:   Procedure Laterality Date    CARDIAC CATHETERIZATION N/A 9/1/2022    Procedure: Cardiac catheterization;  Surgeon: Rosy Rasheed MD;  Location: [address] [place] [address] CATH LAB; Service: Cardiology    CARDIAC CATHETERIZATION N/A 9/1/2022    Procedure: Cardiac Coronary Angiogram;  Surgeon: Rosy Rasheed MD;  Location: [place] CARDIAC CATH LAB; Service: Cardiology       Social History     Socioeconomic History    Marital status: Single     Spouse name: Not on file    Number of children: Not on file    Years of education: Not on file    Highest education level: Not on file   Occupational History    Not on file   Tobacco Use    Smoking status: Current Every Day Smoker     Packs/day: 2 00    Smokeless tobacco: Never Used   Substance and Sexual Activity    Alcohol use: Never    Drug use: Yes     Types: Marijuana    Sexual activity: Not on file   Other Topics Concern    Not on file   Social History Narrative    Not on file     Social Determinants of Health     Financial Resource Strain: Not on file   Food Insecurity: Not on file   Transportation Needs: Not on file   Physical Activity: Not on file   Stress: Not on file   Social Connections: Not on file   Intimate Partner Violence: Not on file   Housing Stability: Not on file       Review of symptoms:   Constitution:  Negative  HEENT:  Negative  Cardiovascular:  Negative  Respiratory:  Negative  Skin:  Negative  Gastrointestinal:  Negative  Genitourinary:  Negative  Musculoskeletal:  Negative  Neurological:  Negative  Endocrine:  Negative  Psychological:  Negative    Vitals: There were no vitals taken for this visit  Physical Exam:     GEN: Alert and oriented x 3, in no acute distress  Well appearing and well nourished     HEENT: Sclera anicteric, conjunctivae pink, mucous membranes moist    NECK: Supple, no carotid bruits, no significant JVD  Trachea midline  HEART: Regular rhythm, normal S1 and S2, no murmurs, clicks, gallops or rubs  LUNGS: Clear to auscultation bilaterally; no wheezes, rales, or rhonchi  No increased work of breathing or signs of respiratory distress  ABDOMEN: Soft, nontender, nondistended, normoactive bowel sounds  EXTREMITIES: Skin warm and well perfused, no clubbing, cyanosis, or edema  NEURO: No focal findings  Normal speech  Mood and affect normal    SKIN: Normal without suspicious lesions on exposed skin

## 2023-08-26 DIAGNOSIS — I10 PRIMARY HYPERTENSION: ICD-10-CM

## 2023-08-28 RX ORDER — AMLODIPINE BESYLATE 10 MG/1
10 TABLET ORAL DAILY
Qty: 90 TABLET | Refills: 3 | Status: SHIPPED | OUTPATIENT
Start: 2023-08-28

## 2024-05-10 DIAGNOSIS — I10 PRIMARY HYPERTENSION: ICD-10-CM

## 2024-05-10 RX ORDER — LISINOPRIL 10 MG/1
10 TABLET ORAL DAILY
Qty: 90 TABLET | Refills: 0 | Status: SHIPPED | OUTPATIENT
Start: 2024-05-10

## 2024-05-10 NOTE — TELEPHONE ENCOUNTER
Patient is requesting med refill    Patient last Ov was 9/16/22 with Cele HOLMAN.    Please schedule pt for a appt

## 2024-08-01 DIAGNOSIS — I10 PRIMARY HYPERTENSION: ICD-10-CM

## 2024-08-01 NOTE — TELEPHONE ENCOUNTER
Name from pharmacy: Amlodipine Besylate 10mg Tablet         Will file in chart as: amLODIPine (NORVASC) 10 mg tablet    Sig: Take 1 tablet by mouth daily.    Disp: 90 tablet    Refills: 1    Start: 8/1/2024    Class: Normal    Non-formulary For: Primary hypertension    Last ordered: 11 months ago (8/28/2023) by RIVER Millan    Last refill: 6/28/2024    Rx #: [id number]    Cardiovascular:  Calcium Channel Blockers [id number]/01/2024 05:14 AM   Protocol Details BP completed in the last 6 months    Valid encounter within last 6 months      To be filled at: [facility] [address]

## 2024-08-02 RX ORDER — AMLODIPINE BESYLATE 10 MG/1
10 TABLET ORAL DAILY
Qty: 90 TABLET | Refills: 1 | Status: SHIPPED | OUTPATIENT
Start: 2024-08-02

## (undated) DEVICE — GLIDESHEATH SLENDER STAINLESS STEEL KIT: Brand: GLIDESHEATH SLENDER

## (undated) DEVICE — STENT ONYXNG27530UX ONYX 2.75X30RX
Type: IMPLANTABLE DEVICE | Status: NON-FUNCTIONAL
Brand: ONYX FRONTIER™

## (undated) DEVICE — CATH DIAG 5FR IMPULSE 100CM FR4

## (undated) DEVICE — TREK CORONARY DILATATION CATHETER 2.50 MM X 12 MM / RAPID-EXCHANGE: Brand: TREK

## (undated) DEVICE — TR BAND RADIAL ARTERY COMPRESSION DEVICE: Brand: TR BAND

## (undated) DEVICE — DGW .035 FC J3MM 260CM TEF: Brand: EMERALD

## (undated) DEVICE — CATH DIAG 5FR IMPULSE 100CM FL3.5

## (undated) DEVICE — NC TREK CORONARY DILATATION CATHETER 3.0 MM X 15 MM / RAPID-EXCHANGE: Brand: NC TREK

## (undated) DEVICE — CATH IVUS EAGLE EYE PLATINUM 5FR 150CM

## (undated) DEVICE — Device: Brand: OMNIWIRE PRESSURE GUIDE WIRE

## (undated) DEVICE — CATH GUIDE LAUNCHER 6FR EBU 3.5